# Patient Record
Sex: MALE | Race: BLACK OR AFRICAN AMERICAN | NOT HISPANIC OR LATINO | Employment: UNEMPLOYED | URBAN - METROPOLITAN AREA
[De-identification: names, ages, dates, MRNs, and addresses within clinical notes are randomized per-mention and may not be internally consistent; named-entity substitution may affect disease eponyms.]

---

## 2019-03-10 ENCOUNTER — HOSPITAL ENCOUNTER (EMERGENCY)
Facility: HOSPITAL | Age: 44
Discharge: HOME | End: 2019-03-10
Attending: EMERGENCY MEDICINE
Payer: OTHER GOVERNMENT

## 2019-03-10 VITALS
SYSTOLIC BLOOD PRESSURE: 130 MMHG | RESPIRATION RATE: 16 BRPM | OXYGEN SATURATION: 96 % | DIASTOLIC BLOOD PRESSURE: 93 MMHG | WEIGHT: 226 LBS | HEART RATE: 77 BPM | TEMPERATURE: 98 F

## 2019-03-10 DIAGNOSIS — R21 RASH AND NONSPECIFIC SKIN ERUPTION: Primary | ICD-10-CM

## 2019-03-10 PROCEDURE — 99284 EMERGENCY DEPT VISIT MOD MDM: CPT | Mod: 25,ER

## 2019-03-10 PROCEDURE — 63600175 PHARM REV CODE 636 W HCPCS: Mod: ER | Performed by: NURSE PRACTITIONER

## 2019-03-10 PROCEDURE — 96372 THER/PROPH/DIAG INJ SC/IM: CPT | Mod: ER

## 2019-03-10 PROCEDURE — 25000003 PHARM REV CODE 250: Mod: ER | Performed by: NURSE PRACTITIONER

## 2019-03-10 RX ORDER — HYDROXYZINE HYDROCHLORIDE 25 MG/1
25 TABLET, FILM COATED ORAL EVERY 6 HOURS
Qty: 12 TABLET | Refills: 0 | Status: SHIPPED | OUTPATIENT
Start: 2019-03-10 | End: 2019-03-13

## 2019-03-10 RX ORDER — HYDROCORTISONE 25 MG/ML
LOTION TOPICAL 2 TIMES DAILY
Qty: 60 ML | Refills: 0 | Status: SHIPPED | OUTPATIENT
Start: 2019-03-10 | End: 2022-03-25

## 2019-03-10 RX ORDER — FAMOTIDINE 20 MG/1
20 TABLET, FILM COATED ORAL 2 TIMES DAILY
Qty: 6 TABLET | Refills: 0 | Status: SHIPPED | OUTPATIENT
Start: 2019-03-10 | End: 2022-03-25

## 2019-03-10 RX ORDER — FAMOTIDINE 20 MG/1
20 TABLET, FILM COATED ORAL
Status: COMPLETED | OUTPATIENT
Start: 2019-03-10 | End: 2019-03-10

## 2019-03-10 RX ORDER — DEXAMETHASONE SODIUM PHOSPHATE 4 MG/ML
8 INJECTION, SOLUTION INTRA-ARTICULAR; INTRALESIONAL; INTRAMUSCULAR; INTRAVENOUS; SOFT TISSUE
Status: COMPLETED | OUTPATIENT
Start: 2019-03-10 | End: 2019-03-10

## 2019-03-10 RX ADMIN — DEXAMETHASONE SODIUM PHOSPHATE 8 MG: 4 INJECTION, SOLUTION INTRAMUSCULAR; INTRAVENOUS at 10:03

## 2019-03-10 RX ADMIN — FAMOTIDINE 20 MG: 20 TABLET ORAL at 10:03

## 2019-03-10 NOTE — DISCHARGE INSTRUCTIONS
Thank you for allowing me to care for you today.  I hope our treatment plan will make you feel better in the next few days.  In order for me to take better care of my future patients and improve our Emergency Department, I would appreciate if you can provide us with feedback.  In the next few days, you may receive a survey in the mail.  If you do, it would mean a great deal to me if you would please take the time to complete it.    Thank you and I hope you feel better.  Morelia Vincent NP

## 2019-03-10 NOTE — ED PROVIDER NOTES
"Encounter Date: 3/10/2019       History     Chief Complaint   Patient presents with    Rash     Pt states," I have a rash on both my hands for about one week."     The patient is a 43-year-old male with no pertinent past medical history who presents to the ED complaining of a rash to both hands for the past week.  The patient is afebrile, non-toxic appearing, and hemodynamically stable.  Rash is "itchy sometimes".  No known allergies.  Patient believes that the rash may have begun after cleaning with chemicals.  Patient reports history of similar symptoms to a lesser degree that resolved without intervention.  No respiratory symptoms. No fevers.  He was seen by his PCP and prescribed ketoconazole which he has been using over the past week without relief of symptoms.      The history is provided by the patient.     Review of patient's allergies indicates:  No Known Allergies  History reviewed. No pertinent past medical history.  Past Surgical History:   Procedure Laterality Date    left knee sx       Family History   Problem Relation Age of Onset    Diabetes Mother      Social History     Tobacco Use    Smoking status: Never Smoker    Smokeless tobacco: Never Used   Substance Use Topics    Alcohol use: Not on file    Drug use: Not on file     Review of Systems   Constitutional: Negative for chills and fever.   HENT: Negative for sore throat.    Respiratory: Negative for shortness of breath.    Cardiovascular: Negative for chest pain.   Gastrointestinal: Negative for abdominal pain, nausea and vomiting.   Genitourinary: Negative for dysuria.   Musculoskeletal: Negative for back pain.   Skin: Positive for rash.   Neurological: Negative for weakness.   Hematological: Does not bruise/bleed easily.       Physical Exam     Initial Vitals [03/10/19 0950]   BP Pulse Resp Temp SpO2   (!) 130/93 77 16 98 °F (36.7 °C) 96 %      MAP       --         Physical Exam    Nursing note and vitals reviewed.  Constitutional: He " appears well-developed and well-nourished.  Non-toxic appearance. No distress.   HENT:   Head: Normocephalic and atraumatic.   Right Ear: Hearing and external ear normal.   Left Ear: Hearing and external ear normal.   Nose: Nose normal.   Mouth/Throat: Uvula is midline, oropharynx is clear and moist and mucous membranes are normal.   No oropharyngeal edema   Eyes: Conjunctivae and EOM are normal.   Neck: Full passive range of motion without pain. Neck supple.   Cardiovascular: Normal rate and normal pulses.   Pulses:       Radial pulses are 2+ on the right side, and 2+ on the left side.   Pulmonary/Chest: Effort normal and breath sounds normal. No respiratory distress. He has no wheezes. He has no rhonchi. He has no rales.   Musculoskeletal: Normal range of motion.   Neurological: He is alert and oriented to person, place, and time. He has normal strength. Gait normal.   Skin: Skin is warm, dry and intact. Capillary refill takes less than 2 seconds. Rash noted. Rash is urticarial.   Photos included   Psychiatric: He has a normal mood and affect. His speech is normal and behavior is normal. Judgment and thought content normal. Cognition and memory are normal.                 ED Course   Procedures  Labs Reviewed - No data to display       Imaging Results    None          Medical Decision Making:   History:   Old Medical Records: I decided to obtain old medical records.  ED Management:  This is a 43 y.o. male patient with presentation of rash. After my evaluation and workup, I believe this patient has a nonspecific rash, possibly allergic rash based upon history and physical. I do not believe the patient has a serious bacterial infection such as cellulitis, nor does the rash resemble serious condition such as Lyme disease, or syphilis.  I also do not believe the patient has scabies or bedbugs based on history.      At the present time, the patient does not have any emergent condition requiring admission in the  hospital.  Patient is stable for discharge.  I have ordered a Decadron injection here in the ED.  Additionally, I will prescribe Pepcid, hydroxyzine, and topical hydrocortisone.  Results, clinical impression and rx discussed with patient. Pt to call for follow up with PCP or referred physician.  Patient has a dermatology appointment scheduled for follow-up.  Emphasized importance of keeping this appointment.  Pt is to return to the ED immediately for any new or worsening symptoms.  Pt had time for ask questions to which they were addressed. Pt expressed understanding.                        Clinical Impression:       ICD-10-CM ICD-9-CM   1. Rash and nonspecific skin eruption R21 782.1                                Morelia Vincent NP  03/10/19 1026       Morelia Vincent NP  03/10/19 3544

## 2021-12-24 ENCOUNTER — CLINICAL SUPPORT (OUTPATIENT)
Dept: URGENT CARE | Facility: CLINIC | Age: 46
End: 2021-12-24
Payer: MEDICAID

## 2021-12-24 DIAGNOSIS — Z11.59 SCREENING FOR VIRAL DISEASE: Primary | ICD-10-CM

## 2021-12-24 PROCEDURE — U0005 INFEC AGEN DETEC AMPLI PROBE: HCPCS

## 2021-12-24 PROCEDURE — U0003 INFECTIOUS AGENT DETECTION BY NUCLEIC ACID (DNA OR RNA); SEVERE ACUTE RESPIRATORY SYNDROME CORONAVIRUS 2 (SARS-COV-2) (CORONAVIRUS DISEASE [COVID-19]), AMPLIFIED PROBE TECHNIQUE, MAKING USE OF HIGH THROUGHPUT TECHNOLOGIES AS DESCRIBED BY CMS-2020-01-R: HCPCS

## 2021-12-25 LAB — SARS-COV-2 RNA RESP QL NAA+PROBE: NOT DETECTED

## 2022-02-08 ENCOUNTER — OFFICE VISIT (OUTPATIENT)
Dept: ORTHOPEDICS | Facility: CLINIC | Age: 47
End: 2022-02-08
Payer: MEDICAID

## 2022-02-08 VITALS
DIASTOLIC BLOOD PRESSURE: 70 MMHG | SYSTOLIC BLOOD PRESSURE: 120 MMHG | RESPIRATION RATE: 18 BRPM | OXYGEN SATURATION: 99 % | BODY MASS INDEX: 29.39 KG/M2 | WEIGHT: 217 LBS | HEIGHT: 72 IN | HEART RATE: 80 BPM

## 2022-02-08 DIAGNOSIS — M76.51 PATELLAR TENDINITIS OF RIGHT KNEE: Primary | ICD-10-CM

## 2022-02-08 DIAGNOSIS — M25.561 PAIN IN BOTH KNEES, UNSPECIFIED CHRONICITY: Primary | ICD-10-CM

## 2022-02-08 DIAGNOSIS — M25.562 PAIN IN BOTH KNEES, UNSPECIFIED CHRONICITY: Primary | ICD-10-CM

## 2022-02-08 PROCEDURE — 99203 PR OFFICE/OUTPT VISIT, NEW, LEVL III, 30-44 MIN: ICD-10-PCS | Mod: S$PBB,,, | Performed by: ORTHOPAEDIC SURGERY

## 2022-02-08 PROCEDURE — 99999 PR PBB SHADOW E&M-EST. PATIENT-LVL III: ICD-10-PCS | Mod: PBBFAC,,, | Performed by: ORTHOPAEDIC SURGERY

## 2022-02-08 PROCEDURE — 99213 OFFICE O/P EST LOW 20 MIN: CPT | Mod: PBBFAC,PN | Performed by: ORTHOPAEDIC SURGERY

## 2022-02-08 PROCEDURE — 99999 PR PBB SHADOW E&M-EST. PATIENT-LVL III: CPT | Mod: PBBFAC,,, | Performed by: ORTHOPAEDIC SURGERY

## 2022-02-08 PROCEDURE — 99203 OFFICE O/P NEW LOW 30 MIN: CPT | Mod: S$PBB,,, | Performed by: ORTHOPAEDIC SURGERY

## 2022-02-08 RX ORDER — METHYLPREDNISOLONE 4 MG/1
TABLET ORAL
Qty: 1 EACH | Refills: 0 | Status: SHIPPED | OUTPATIENT
Start: 2022-02-08 | End: 2022-03-25

## 2022-02-08 NOTE — PROGRESS NOTES
NEW PATIENT ORTHOPAEDIC: Knee    PRIMARY CARE PHYSICIAN: Primary Doctor No   REFERRING PROVIDER: Aaareferral Self  No address on file     ASSESSMENT & PLAN:    Impression:  Right Knee Patellar tendonitis     Follow Up Plan: PRN    Non operative care:    Vahe Molina has physical exam evidence of above and wishes to pursue an non-operative care. I am recommending the following: medrol dosepak.  Patient is a 1 week history of right knee pain which is primarily centered over the anterior portion of his knee.  He does not have any injury that he can recall.  No instability symptoms.  No meniscal pathology.  He has tenderness directly over the insertion of his patellar tendon on his tibia tubercle.  This would be consistent with patella tendinitis.  For this I am recommending a Medrol Dosepak.  Should his pain continue despite activity modification and the Dosepak next step would be attempted formal physical therapy.    The patient has been ordered:  Pain Prescription    CONSULTS:   None    ACTIVE PROBLEM LIST  There is no problem list on file for this patient.          SUBJECTIVE    CHIEF COMPLAINT: Knee Pain    HPI:   Vahe Molina is a 46 y.o. male here for evaluation and management of right knee pain. There is not a specific incident that brought about this pain. he has had progressive problems with the knee(s) starting 1 weeks ago but is now progressing to interfere with activities which include: enjoying hobbies and exercise    Currently the pain in the joint is rated at mild with activity. The pain is intermittent and is located in the knee, located anterior. The pain is described as aching and throbbing. Relieving factors include rest and over the counter medication .     There is not associated Catching, Clicking and Popping.     Vahe Molina has no additional complaints.     PROGRESSIVE SYMPTOMS:  Pain limiting ability to stay fit and healthy    FUNCTIONAL STATUS:   Participate in  recreational activities     PREVIOUS TREATMENTS:  Medical: OTC NSAIDS  Physical Therapy: Activities Modified   Previous Orthopaedic Surgery: ACL surgery on left knee    REVIEW OF SYSTEMS:  PAIN ASSESSMENT:  See HPI.  MUSCULOSKELETAL: See HPI.  OTHER 10 point review of systems is negative except as stated in HPI above    PAST MEDICAL HISTORY   has no past medical history on file.     PAST SURGICAL HISTORY   has a past surgical history that includes left knee sx.     FAMILY HISTORY  family history includes Diabetes in his mother.     SOCIAL HISTORY   reports that he has never smoked. He has never used smokeless tobacco. He reports previous drug use. He reports that he does not drink alcohol.     ALLERGIES   Review of patient's allergies indicates:  No Known Allergies     MEDICATIONS  Current Outpatient Medications on File Prior to Visit   Medication Sig Dispense Refill    famotidine (PEPCID) 20 MG tablet Take 1 tablet (20 mg total) by mouth 2 (two) times daily. for 3 days 6 tablet 0    hydrocortisone 2.5 % lotion Apply topically 2 (two) times daily. 60 mL 0     No current facility-administered medications on file prior to visit.          PHYSICAL EXAM   height is 6' (1.829 m) and weight is 98.4 kg (217 lb). His blood pressure is 120/70 and his pulse is 80. His respiration is 18 and oxygen saturation is 99%.   Body mass index is 29.43 kg/m².      All other systems deferred.  GENERAL:  No acute distress  HABITUS: Normal  GAIT: Non-antalgic  SKIN: Normal     KNEE EXAM:    right:   Effusion: No joint effusion  TTP: yes over distal 1/3 of patella tendon  no crepitus with passive knee ROM  Passive ROM: Extension 0, Flexion 130  No pain with manipulation of patella  Stable to varus/valgus stress. No increased laxity to anterior/posterior drawer testing  negative Ignacio's test  No pain with IR/ER rotation of the hip  5/5 strength in knee flexion and extension, ankle plantarflexion and dorsiflexion  Neurovascular Status:  Sensation intact to light touch in Sural, Saphenous, SPN, DPN, Tibial nerve distribution  2+ pulse DP/PT, normal capillary refill, foot has normal warmth    DATA:  Diagnostic tests reviewed for today's visit:     The obtained knee radiographs appears normal for age. There is good alignment with no evidence of fracture, bony abnormalities or signficant degenerative changes.

## 2022-03-24 ENCOUNTER — HOSPITAL ENCOUNTER (INPATIENT)
Facility: HOSPITAL | Age: 47
LOS: 3 days | Discharge: HOME | DRG: 373 | End: 2022-03-27
Attending: EMERGENCY MEDICINE | Admitting: INTERNAL MEDICINE
Payer: MEDICAID

## 2022-03-24 DIAGNOSIS — B99.9 INTRA-ABDOMINAL INFECTION: ICD-10-CM

## 2022-03-24 DIAGNOSIS — R10.13 EPIGASTRIC ABDOMINAL PAIN: ICD-10-CM

## 2022-03-24 DIAGNOSIS — A41.9 SEPSIS, DUE TO UNSPECIFIED ORGANISM, UNSPECIFIED WHETHER ACUTE ORGAN DYSFUNCTION PRESENT: Primary | ICD-10-CM

## 2022-03-24 LAB
ALBUMIN SERPL-MCNC: 3.7 G/DL (ref 3.3–5.5)
ALBUMIN SERPL-MCNC: 4 G/DL (ref 3.3–5.5)
ALLENS TEST: ABNORMAL
ALLENS TEST: ABNORMAL
ALP SERPL-CCNC: 58 U/L (ref 42–141)
ALP SERPL-CCNC: 62 U/L (ref 42–141)
BILIRUB SERPL-MCNC: 2.2 MG/DL (ref 0.2–1.6)
BILIRUB SERPL-MCNC: 2.4 MG/DL (ref 0.2–1.6)
BILIRUBIN, POC UA: ABNORMAL
BLOOD, POC UA: NEGATIVE
BUN SERPL-MCNC: 12 MG/DL (ref 7–22)
CALCIUM SERPL-MCNC: 9.6 MG/DL (ref 8–10.3)
CHLORIDE SERPL-SCNC: 102 MMOL/L (ref 98–108)
CLARITY, POC UA: ABNORMAL
COLOR, POC UA: ABNORMAL
CREAT SERPL-MCNC: 1.2 MG/DL (ref 0.6–1.2)
CTP QC/QA: YES
GLUCOSE SERPL-MCNC: 150 MG/DL (ref 73–118)
GLUCOSE, POC UA: NEGATIVE
HCO3 UR-SCNC: 22.7 MMOL/L (ref 24–28)
HCO3 UR-SCNC: 23.4 MMOL/L (ref 24–28)
KETONES, POC UA: ABNORMAL
LDH SERPL L TO P-CCNC: 1.47 MMOL/L (ref 0.5–2.2)
LDH SERPL L TO P-CCNC: 2.08 MMOL/L (ref 0.5–2.2)
LEUKOCYTE EST, POC UA: NEGATIVE
NITRITE, POC UA: NEGATIVE
PCO2 BLDA: 30.4 MMHG (ref 35–45)
PCO2 BLDA: 35.5 MMHG (ref 35–45)
PH SMN: 7.41 [PH] (ref 7.35–7.45)
PH SMN: 7.49 [PH] (ref 7.35–7.45)
PH UR STRIP: 5.5 [PH]
PO2 BLDA: 60 MMHG (ref 40–60)
PO2 BLDA: 80 MMHG (ref 40–60)
POC ALT (SGPT): 29 U/L (ref 10–47)
POC ALT (SGPT): 67 U/L (ref 10–47)
POC AMYLASE: 52 U/L (ref 14–97)
POC AST (SGOT): 43 U/L (ref 11–38)
POC AST (SGOT): 44 U/L (ref 11–38)
POC BE: -1 MMOL/L
POC BE: 1 MMOL/L
POC GGT: 29 U/L (ref 5–65)
POC SATURATED O2: 91 % (ref 95–100)
POC SATURATED O2: 97 % (ref 95–100)
POC TCO2: 24 MMOL/L (ref 18–33)
POC TCO2: 24 MMOL/L (ref 24–29)
POC TCO2: 24 MMOL/L (ref 24–29)
POCT GLUCOSE: 152 MG/DL (ref 70–110)
POCT GLUCOSE: 152 MG/DL (ref 70–110)
POTASSIUM BLD-SCNC: 4.2 MMOL/L (ref 3.6–5.1)
PROTEIN, POC UA: ABNORMAL
PROTEIN, POC: 8.4 G/DL (ref 6.4–8.1)
PROTEIN, POC: 8.5 G/DL (ref 6.4–8.1)
SAMPLE: ABNORMAL
SAMPLE: ABNORMAL
SARS-COV-2 RDRP RESP QL NAA+PROBE: NEGATIVE
SITE: ABNORMAL
SITE: ABNORMAL
SODIUM BLD-SCNC: 136 MMOL/L (ref 128–145)
SPECIFIC GRAVITY, POC UA: 1.02
UROBILINOGEN, POC UA: 0.2 E.U./DL

## 2022-03-24 PROCEDURE — 96361 HYDRATE IV INFUSION ADD-ON: CPT | Mod: ER

## 2022-03-24 PROCEDURE — 25000003 PHARM REV CODE 250: Mod: ER | Performed by: NURSE PRACTITIONER

## 2022-03-24 PROCEDURE — 21400001 HC TELEMETRY ROOM

## 2022-03-24 PROCEDURE — 63600175 PHARM REV CODE 636 W HCPCS: Mod: ER | Performed by: NURSE PRACTITIONER

## 2022-03-24 PROCEDURE — U0002 COVID-19 LAB TEST NON-CDC: HCPCS | Mod: ER | Performed by: NURSE PRACTITIONER

## 2022-03-24 PROCEDURE — 85025 COMPLETE CBC W/AUTO DIFF WBC: CPT | Mod: ER

## 2022-03-24 PROCEDURE — 82962 GLUCOSE BLOOD TEST: CPT | Mod: ER

## 2022-03-24 PROCEDURE — 93010 ELECTROCARDIOGRAM REPORT: CPT | Mod: ,,, | Performed by: INTERNAL MEDICINE

## 2022-03-24 PROCEDURE — 93010 EKG 12-LEAD: ICD-10-PCS | Mod: ,,, | Performed by: INTERNAL MEDICINE

## 2022-03-24 PROCEDURE — 96365 THER/PROPH/DIAG IV INF INIT: CPT | Mod: ER

## 2022-03-24 PROCEDURE — 93005 ELECTROCARDIOGRAM TRACING: CPT | Mod: ER

## 2022-03-24 PROCEDURE — 96375 TX/PRO/DX INJ NEW DRUG ADDON: CPT | Mod: ER

## 2022-03-24 PROCEDURE — 87040 BLOOD CULTURE FOR BACTERIA: CPT | Mod: 59 | Performed by: NURSE PRACTITIONER

## 2022-03-24 PROCEDURE — 82150 ASSAY OF AMYLASE: CPT | Mod: ER

## 2022-03-24 PROCEDURE — 99285 EMERGENCY DEPT VISIT HI MDM: CPT | Mod: 25,ER

## 2022-03-24 PROCEDURE — 25500020 PHARM REV CODE 255: Mod: ER | Performed by: EMERGENCY MEDICINE

## 2022-03-24 PROCEDURE — 82803 BLOOD GASES ANY COMBINATION: CPT | Mod: ER

## 2022-03-24 PROCEDURE — 80053 COMPREHEN METABOLIC PANEL: CPT | Mod: ER

## 2022-03-24 PROCEDURE — 81003 URINALYSIS AUTO W/O SCOPE: CPT | Mod: ER

## 2022-03-24 RX ORDER — ACETAMINOPHEN 325 MG/1
650 TABLET ORAL EVERY 8 HOURS PRN
Status: DISCONTINUED | OUTPATIENT
Start: 2022-03-24 | End: 2022-03-27 | Stop reason: HOSPADM

## 2022-03-24 RX ORDER — GLUCAGON 1 MG
1 KIT INJECTION
Status: DISCONTINUED | OUTPATIENT
Start: 2022-03-24 | End: 2022-03-27 | Stop reason: HOSPADM

## 2022-03-24 RX ORDER — MAG HYDROX/ALUMINUM HYD/SIMETH 200-200-20
30 SUSPENSION, ORAL (FINAL DOSE FORM) ORAL ONCE
Status: COMPLETED | OUTPATIENT
Start: 2022-03-24 | End: 2022-03-24

## 2022-03-24 RX ORDER — MORPHINE SULFATE 4 MG/ML
4 INJECTION, SOLUTION INTRAMUSCULAR; INTRAVENOUS EVERY 4 HOURS PRN
Status: DISCONTINUED | OUTPATIENT
Start: 2022-03-24 | End: 2022-03-27 | Stop reason: HOSPADM

## 2022-03-24 RX ORDER — SODIUM CHLORIDE 0.9 % (FLUSH) 0.9 %
10 SYRINGE (ML) INJECTION
Status: DISCONTINUED | OUTPATIENT
Start: 2022-03-24 | End: 2022-03-27 | Stop reason: HOSPADM

## 2022-03-24 RX ORDER — INSULIN ASPART 100 [IU]/ML
0-5 INJECTION, SOLUTION INTRAVENOUS; SUBCUTANEOUS EVERY 6 HOURS PRN
Status: DISCONTINUED | OUTPATIENT
Start: 2022-03-24 | End: 2022-03-27 | Stop reason: HOSPADM

## 2022-03-24 RX ORDER — FAMOTIDINE 10 MG/ML
20 INJECTION INTRAVENOUS
Status: COMPLETED | OUTPATIENT
Start: 2022-03-24 | End: 2022-03-24

## 2022-03-24 RX ORDER — MORPHINE SULFATE 4 MG/ML
2 INJECTION, SOLUTION INTRAMUSCULAR; INTRAVENOUS EVERY 4 HOURS PRN
Status: DISCONTINUED | OUTPATIENT
Start: 2022-03-24 | End: 2022-03-27 | Stop reason: HOSPADM

## 2022-03-24 RX ORDER — LIDOCAINE HYDROCHLORIDE 20 MG/ML
10 SOLUTION OROPHARYNGEAL ONCE
Status: COMPLETED | OUTPATIENT
Start: 2022-03-24 | End: 2022-03-24

## 2022-03-24 RX ORDER — ONDANSETRON 2 MG/ML
4 INJECTION INTRAMUSCULAR; INTRAVENOUS EVERY 8 HOURS PRN
Status: DISCONTINUED | OUTPATIENT
Start: 2022-03-24 | End: 2022-03-27 | Stop reason: HOSPADM

## 2022-03-24 RX ORDER — KETOROLAC TROMETHAMINE 30 MG/ML
15 INJECTION, SOLUTION INTRAMUSCULAR; INTRAVENOUS
Status: COMPLETED | OUTPATIENT
Start: 2022-03-24 | End: 2022-03-24

## 2022-03-24 RX ORDER — ACETAMINOPHEN 500 MG
1000 TABLET ORAL
Status: COMPLETED | OUTPATIENT
Start: 2022-03-24 | End: 2022-03-24

## 2022-03-24 RX ORDER — SODIUM CHLORIDE, SODIUM LACTATE, POTASSIUM CHLORIDE, CALCIUM CHLORIDE 600; 310; 30; 20 MG/100ML; MG/100ML; MG/100ML; MG/100ML
INJECTION, SOLUTION INTRAVENOUS CONTINUOUS
Status: DISCONTINUED | OUTPATIENT
Start: 2022-03-24 | End: 2022-03-27 | Stop reason: HOSPADM

## 2022-03-24 RX ADMIN — PIPERACILLIN AND TAZOBACTAM 4.5 G: 4; .5 INJECTION, POWDER, LYOPHILIZED, FOR SOLUTION INTRAVENOUS; PARENTERAL at 05:03

## 2022-03-24 RX ADMIN — LIDOCAINE HYDROCHLORIDE 10 ML: 20 SOLUTION ORAL at 05:03

## 2022-03-24 RX ADMIN — IOHEXOL 100 ML: 350 INJECTION, SOLUTION INTRAVENOUS at 08:03

## 2022-03-24 RX ADMIN — KETOROLAC TROMETHAMINE 15 MG: 30 INJECTION, SOLUTION INTRAMUSCULAR; INTRAVENOUS at 05:03

## 2022-03-24 RX ADMIN — SODIUM CHLORIDE, SODIUM LACTATE, POTASSIUM CHLORIDE, AND CALCIUM CHLORIDE: .6; .31; .03; .02 INJECTION, SOLUTION INTRAVENOUS at 10:03

## 2022-03-24 RX ADMIN — SODIUM CHLORIDE 1000 ML: 0.9 INJECTION, SOLUTION INTRAVENOUS at 05:03

## 2022-03-24 RX ADMIN — SODIUM CHLORIDE 1000 ML: 0.9 INJECTION, SOLUTION INTRAVENOUS at 08:03

## 2022-03-24 RX ADMIN — ALUMINA, MAGNESIA, AND SIMETHICONE ORAL SUSPENSION REGULAR STRENGTH 30 ML: 1200; 1200; 120 SUSPENSION ORAL at 05:03

## 2022-03-24 RX ADMIN — FAMOTIDINE 20 MG: 10 INJECTION INTRAVENOUS at 05:03

## 2022-03-24 RX ADMIN — ACETAMINOPHEN 1000 MG: 500 TABLET ORAL at 04:03

## 2022-03-24 NOTE — ED NOTES
Pt to ED for ruq abd pain X 3 days that began after eating pizza.  He has not eaten for about two days, is tolerating PO.  Denies nausea, vomiting, diarrhea .    LOC: Patient name and date of birth verified. The patient is awake, alert and aware of environment with an appropriate affect, the patient is oriented x 3 and speaking appropriately.   APPEARANCE: Patient resting comfortably, patient is clean and well groomed, patient's clothing is properly fastened.  SKIN: The skin is warm and dry, color consistent with ethnicity, patient has normal skin turgor and moist mucus membranes, skin intact, no breakdown or bruising noted.  MUSCULOSKELETAL: Patient moving all extremities well, no obvious swelling or deformities noted.   RESPIRATORY: Respirations are spontaneous, patient has a normal effort and rate, no accessory muscle use noted.  CARDIAC: Patient has a normal rate and rhythm, no periphreal edema noted, capillary refill < 3 seconds.  ABDOMEN: Soft and tender on palpation to ruq, no distention noted. Bowel sounds present in all four quadrants.  NEUROLOGIC: Eyes open spontaneously, behavior appropriate to situation, follows commands, facial expression symmetrical, bilateral hand grasp equal and even, purposeful motor response noted, normal sensation in all extremities when touched with a finger.

## 2022-03-24 NOTE — ED PROVIDER NOTES
Encounter Date: 3/24/2022    SCRIBE #1 NOTE: I, Balbina Moreira, am scribing for, and in the presence of,  Courtney Burnham NP. I have scribed the following portions of the note - Other sections scribed: HPI; ROS.       History     Chief Complaint   Patient presents with    Abdominal Pain     Pt reports abd pain x2 days, seen at urgent care, no relief with Bentyl      Vahemerari Molina is a 46 y.o. male with Hx of DM who presents to the ED for chief complaint of mid to RUQ abdominal pain onset 4 days ago. Patient reports he went to Urgent Care 3 days ago where he was prescribed medications and was told to go to the ER if symptoms did not improve. He states that the pain is not as bad as the first day, but is still present. Patient notes bowel movements are normal. He denies any previous abdominal surgery, recent travel outside of the country, eating seafood, or any known sick contact. Patient states that when the pain first started he had just eaten pizza. He reports he has not eaten in 2 days due to the pain and loss of appetite. Patient present with 100.4 F fever upon arrival to the ED. He denies chills, nausea, vomiting, diarrhea, hematochezia, dysuria, difficulty urinating, back pain, chest pain, shortness of breath or cough. Patient also denies use of tobacco, EtOH, or recreational drugs.     The history is provided by the patient. No  was used.     Review of patient's allergies indicates:  No Known Allergies  Past Medical History:   Diagnosis Date    Diabetes mellitus      Past Surgical History:   Procedure Laterality Date    left knee sx       Family History   Problem Relation Age of Onset    Diabetes Mother      Social History     Tobacco Use    Smoking status: Never Smoker    Smokeless tobacco: Never Used   Substance Use Topics    Alcohol use: Never    Drug use: Not Currently     Review of Systems   Constitutional: Positive for appetite change and fever.   HENT: Negative for  congestion, ear pain, rhinorrhea and sore throat.    Respiratory: Negative for cough and shortness of breath.    Cardiovascular: Negative for chest pain.   Gastrointestinal: Positive for abdominal pain. Negative for blood in stool, constipation, diarrhea, nausea and vomiting.   Genitourinary: Negative for difficulty urinating and dysuria.   Musculoskeletal: Negative for back pain.   Skin: Negative for rash.   Neurological: Negative for headaches.   Psychiatric/Behavioral: Negative for confusion.   All other systems reviewed and are negative.      Physical Exam     Initial Vitals [03/24/22 1604]   BP Pulse Resp Temp SpO2   (!) 131/91 104 17 (!) 100.4 °F (38 °C) 99 %      MAP       --         Physical Exam    Vitals reviewed.  Constitutional: He appears well-developed and well-nourished. He is not diaphoretic.  Non-toxic appearance. He does not have a sickly appearance. He does not appear ill. No distress.   HENT:   Head: Normocephalic and atraumatic.   Right Ear: External ear normal.   Left Ear: External ear normal.   Neck:   Normal range of motion.  Cardiovascular: Normal rate, regular rhythm, normal heart sounds and intact distal pulses.   Pulmonary/Chest: Breath sounds normal. No respiratory distress.   Abdominal: Abdomen is soft. Bowel sounds are normal. There is abdominal tenderness in the epigastric area and periumbilical area.   Musculoskeletal:         General: Normal range of motion.      Cervical back: Normal range of motion.     Neurological: He is alert and oriented to person, place, and time. GCS eye subscore is 4. GCS verbal subscore is 5. GCS motor subscore is 6.   Skin: Skin is warm, dry and intact. No rash noted. No erythema.   Psychiatric: He has a normal mood and affect. Thought content normal.         ED Course   Critical Care    Date/Time: 4/7/2022 10:13 AM  Performed by: Courtney Burnham NP  Authorized by: Beltran Edwards MD   Direct patient critical care time: 10 minutes  Additional  history critical care time: 5 minutes  Ordering / reviewing critical care time: 5 minutes  Documentation critical care time: 5 minutes  Consulting other physicians critical care time: 5 minutes  Consult with family critical care time: 0 minutes  Total critical care time (exclusive of procedural time) : 30 minutes  Critical care was necessary to treat or prevent imminent or life-threatening deterioration of the following conditions: shock and sepsis.  Critical care was time spent personally by me on the following activities: discussions with consultants, evaluation of patient's response to treatment, obtaining history from patient or surrogate, ordering and review of laboratory studies, development of treatment plan with patient or surrogate, examination of patient, ordering and performing treatments and interventions, ordering and review of radiographic studies and re-evaluation of patient's condition.        Labs Reviewed   POCT URINALYSIS W/O SCOPE - Abnormal; Notable for the following components:       Result Value    Bilirubin, UA 1+ (*)     Ketones, UA 1+ (*)     Protein, UA 2+ (*)     All other components within normal limits   POCT GLUCOSE - Abnormal; Notable for the following components:    POCT Glucose 152 (*)     All other components within normal limits   POCT LIVER PANEL - Abnormal; Notable for the following components:    ALT (SGPT), POC 67 (*)     AST (SGOT), POC 44 (*)     Bilirubin, POC 2.4 (*)     Protein, POC 8.5 (*)     All other components within normal limits   POCT CMP - Abnormal; Notable for the following components:    AST (SGOT), POC 43 (*)     POC Glucose 150 (*)     Bilirubin, POC 2.2 (*)     Protein, POC 8.4 (*)     All other components within normal limits   ISTAT PROCEDURE - Abnormal; Notable for the following components:    POC PH 7.494 (*)     POC PCO2 30.4 (*)     POC PO2 80 (*)     POC HCO3 23.4 (*)     All other components within normal limits   ISTAT PROCEDURE - Abnormal; Notable  for the following components:    POC HCO3 22.7 (*)     POC SATURATED O2 91 (*)     All other components within normal limits   POCT GLUCOSE - Abnormal; Notable for the following components:    POCT Glucose 152 (*)     All other components within normal limits   CULTURE, BLOOD   CULTURE, BLOOD   POCT CBC   POCT URINALYSIS W/O SCOPE   SARS-COV-2 RDRP GENE    Narrative:     This test utilizes isothermal nucleic acid amplification   technology to detect the SARS-CoV-2 RdRp nucleic acid segment.   The analytical sensitivity (limit of detection) is 125 genome   equivalents/mL.   A POSITIVE result implies infection with the SARS-CoV-2 virus;   the patient is presumed to be contagious.     A NEGATIVE result means that SARS-CoV-2 nucleic acids are not   present above the limit of detection. A NEGATIVE result should be   treated as presumptive. It does not rule out the possibility of   COVID-19 and should not be the sole basis for treatment decisions.   If COVID-19 is strongly suspected based on clinical and exposure   history, re-testing using an alternate molecular assay should be   considered.   This test is only for use under the Food and Drug   Administration s Emergency Use Authorization (EUA).   Commercial kits are provided by [employer].   Performance characteristics of the EUA have been independently   verified by [facility] Department of   Pathology and Laboratory Medicine.   _________________________________________________________________   The authorized Fact Sheet for Healthcare Providers and the authorized Fact   Sheet for Patients of the ID NOW COVID-19 are available on the FDA   website:     https://www.fda.gov/media/412523/download  https://www.fda.gov/media/953917/download          POCT CMP   POCT LIVER PANEL   POCT GLUCOSE MONITORING CONTINUOUS     EKG Readings: (Independently Interpreted)   Initial Reading: No STEMI. Rhythm: Sinus Tachycardia. Heart Rate: 103. Ectopy: No Ectopy.  Conduction: Normal.       Imaging Results           CT Abdomen Pelvis With Contrast (Final result)  Result time 03/24/22 20:39:18    Final result by Bautista Rao MD (03/24/22 20:39:18)                 Impression:      1. Grossly abnormal appearance with extensive inflammatory changes seen involving mid and distal small bowel loops in the right lower quadrant and mid to lower abdomen.  Questionable markedly dilated appendix seen measuring 3 cm versus abnormal appearance of distal small bowel loops.  No free air seen, however there is appearance of developing multilocular abscess in the mid to lower abdomen which could potentially relate to contained perforation.  Acute appendicitis versus reactive or other infectious/inflammatory enteritis to be considered.  Potential neoplastic process not excluded.  Surgical consultation is advised.  2. Additional findings as detailed above.  This report was flagged in Epic as abnormal.      Electronically signed by: Bautista Rao MD  Date:    03/24/2022  Time:    20:39             Narrative:    EXAMINATION:  CT ABDOMEN PELVIS WITH CONTRAST    CLINICAL HISTORY:  Abdominal abscess/infection suspected;    TECHNIQUE:  Low dose axial images, sagittal and coronal reformations were obtained from the lung bases to the pubic symphysis following the IV administration of 100 mL of Omnipaque 350.    COMPARISON:  None.    FINDINGS:  The visualized portion of the heart is unremarkable.  Minimal atelectatic changes are seen at the lung bases.  No focal consolidation or pleural effusion seen.    Liver is enlarged measuring 19.5 cm.  No significant focal hepatic abnormalities are seen.  There is no intra-or extrahepatic biliary ductal dilatation.  The gallbladder is unremarkable.  High density material or contrast are seen within the stomach and small bowel loops.  The stomach, pancreas, spleen, and adrenal glands are unremarkable.    Kidneys enhance normally with no evidence of  hydronephrosis.  Ureters are difficult to track distally.  Urinary bladder is nondistended.  Prostate is unremarkable.    Grossly abnormal appearance seen of mid and distal small bowel loops in the mid to lower abdomen and right lower quadrant.  Questionable markedly dilated appendix seen measuring 3 cm.  Extensive inflammatory changes are seen in the region with questionable developing multilocular abscess.  This is difficult to accurately measure.  No convincing CT evidence of bowel obstruction at this time.  There is diverticulosis seen involving the distal descending and proximal sigmoid colon.  Trace peritoneal free fluid is seen.  No free air or perforation seen.    Aorta tapers normally.    No acute osseous abnormality identified. Small umbilical hernia is seen.                                 Medications   iohexol (OMNIPAQUE) oral solution 15 mL (has no administration in time range)   sodium chloride 0.9% flush 10 mL (has no administration in time range)   acetaminophen tablet 650 mg (has no administration in time range)   lactated ringers infusion (has no administration in time range)   piperacillin-tazobactam 4.5 g in dextrose 5 % 100 mL IVPB (ready to mix system) (has no administration in time range)   morphine injection 2 mg (has no administration in time range)   morphine injection 4 mg (has no administration in time range)   ondansetron injection 4 mg (has no administration in time range)   dextrose 50% injection 12.5 g (has no administration in time range)   glucagon (human recombinant) injection 1 mg (has no administration in time range)   insulin aspart U-100 pen 0-5 Units (has no administration in time range)   acetaminophen tablet 1,000 mg (1,000 mg Oral Given 3/24/22 1642)   sodium chloride 0.9% bolus 1,000 mL (0 mLs Intravenous Stopped 3/24/22 1806)   ketorolac injection 15 mg (15 mg Intravenous Given 3/24/22 1706)   famotidine (PF) injection 20 mg (20 mg Intravenous Given 3/24/22 1707)    aluminum-magnesium hydroxide-simethicone 200-200-20 mg/5 mL suspension 30 mL (30 mLs Oral Given 3/24/22 1707)     And   LIDOcaine HCl 2% oral solution 10 mL (10 mLs Oral Given 3/24/22 1707)   piperacillin-tazobactam 4.5 g in dextrose 5 % 100 mL IVPB (ready to mix system) (0 g Intravenous Stopped 3/24/22 1817)   iohexoL (OMNIPAQUE 350) injection 100 mL (100 mLs Intravenous Given 3/24/22 2007)   sodium chloride 0.9% bolus 1,000 mL (1,000 mLs Intravenous New Bag 3/24/22 2018)     Medical Decision Making:   ED Management:  46-year-old male presenting to the ED with epigastric abdominal pain with low grade fevers.  Patient is nontoxic and in no distress.  Patient has tenderness to palpation of the midepigastric region.  CBC with leukocytosis white blood cell count 15771. Blood cultures pending.  Lactate initially 2.08.  Given 30 ml/kg NS.  IV Zosyn.  CT with extensive inflammatory changes in the mid and distal small bowel loops in the right lower quadrant and mid to lower abdomen.  Acute appendicitis with contained perforation versus reactive or infectious enteritis versus neoplastic process.  Discussed the case with general surgery Dr. Rose who would like the patient transferred to [facility] for admission.  Recommend NPO, IV Zosyn, LR infusion, pain meds.  Repeat lactic is 1.47.  Tachycardia and fever improved.  Pain is well controlled.  Patient is agreeable to transfer.    This case was discussed with my attending physician Dr. Edwards who examined the patient and agrees with my plan of care.          Scribe Attestation:   Scribe #1: I performed the above scribed service and the documentation accurately describes the services I performed. I attest to the accuracy of the note.    Attending Attestation:   Physician Attestation Statement for Resident:  As the supervising MD   Physician Attestation Statement: I have personally seen and examined this patient.   I agree with the above history. -: midepigastric pain x 4  days coupled with low grade fevers. Pt has not been on steroids recently.   As the supervising MD I agree with the above PE.   -: +ttp midepigastric, no ttp mcburney's, rovsing neg el neg   As the supervising MD I agree with the above treatment, course, plan, and disposition.   -: Ct, labs, abx.                ED Course as of 03/24/22 2147   Thu Mar 24, 2022   2055 Discussed case with general surgeon on-call.  He will look at CT scan and call me back. [MT]      ED Course User Index  [MT] Courtney Burnham NP           Scribe attestation: IANGEL, personally performed the services described in this documentation.  All medical record entries made by the scribe were at my direction and in my presence.  I have reviewed the chart and agree that the record reflects my personal performance and is accurate and complete.  Clinical Impression:   Final diagnoses:  [R10.13] Epigastric abdominal pain  [A41.9] Sepsis, due to unspecified organism, unspecified whether acute organ dysfunction present (Primary)  [B99.9] Intra-abdominal infection          ED Disposition Condition    Admit               Courtney Burnham NP  03/24/22 2141       Courtney Burnham NP  04/07/22 1015

## 2022-03-25 LAB
ALBUMIN SERPL BCP-MCNC: 3 G/DL (ref 3.5–5.2)
ALP SERPL-CCNC: 53 U/L (ref 55–135)
ALT SERPL W/O P-5'-P-CCNC: 25 U/L (ref 10–44)
ANION GAP SERPL CALC-SCNC: 7 MMOL/L (ref 8–16)
AST SERPL-CCNC: 21 U/L (ref 10–40)
BASOPHILS # BLD AUTO: 0.03 K/UL (ref 0–0.2)
BASOPHILS NFR BLD: 0.2 % (ref 0–1.9)
BILIRUB SERPL-MCNC: 1.9 MG/DL (ref 0.1–1)
BUN SERPL-MCNC: 13 MG/DL (ref 6–20)
CALCIUM SERPL-MCNC: 8.6 MG/DL (ref 8.7–10.5)
CHLORIDE SERPL-SCNC: 104 MMOL/L (ref 95–110)
CO2 SERPL-SCNC: 24 MMOL/L (ref 23–29)
CREAT SERPL-MCNC: 1.3 MG/DL (ref 0.5–1.4)
DIFFERENTIAL METHOD: ABNORMAL
EOSINOPHIL # BLD AUTO: 0.1 K/UL (ref 0–0.5)
EOSINOPHIL NFR BLD: 0.8 % (ref 0–8)
ERYTHROCYTE [DISTWIDTH] IN BLOOD BY AUTOMATED COUNT: 12 % (ref 11.5–14.5)
EST. GFR  (AFRICAN AMERICAN): >60 ML/MIN/1.73 M^2
EST. GFR  (NON AFRICAN AMERICAN): >60 ML/MIN/1.73 M^2
GLUCOSE SERPL-MCNC: 144 MG/DL (ref 70–110)
HCT VFR BLD AUTO: 35.4 % (ref 40–54)
HGB BLD-MCNC: 11.3 G/DL (ref 14–18)
IMM GRANULOCYTES # BLD AUTO: 0.07 K/UL (ref 0–0.04)
IMM GRANULOCYTES NFR BLD AUTO: 0.5 % (ref 0–0.5)
LYMPHOCYTES # BLD AUTO: 2.1 K/UL (ref 1–4.8)
LYMPHOCYTES NFR BLD: 16.2 % (ref 18–48)
MCH RBC QN AUTO: 28.8 PG (ref 27–31)
MCHC RBC AUTO-ENTMCNC: 31.9 G/DL (ref 32–36)
MCV RBC AUTO: 90 FL (ref 82–98)
MONOCYTES # BLD AUTO: 1.4 K/UL (ref 0.3–1)
MONOCYTES NFR BLD: 10.2 % (ref 4–15)
NEUTROPHILS # BLD AUTO: 9.6 K/UL (ref 1.8–7.7)
NEUTROPHILS NFR BLD: 72.1 % (ref 38–73)
NRBC BLD-RTO: 0 /100 WBC
PLATELET # BLD AUTO: 274 K/UL (ref 150–450)
PMV BLD AUTO: 10.2 FL (ref 9.2–12.9)
POCT GLUCOSE: 132 MG/DL (ref 70–110)
POTASSIUM SERPL-SCNC: 3.9 MMOL/L (ref 3.5–5.1)
PROT SERPL-MCNC: 6.9 G/DL (ref 6–8.4)
RBC # BLD AUTO: 3.93 M/UL (ref 4.6–6.2)
SODIUM SERPL-SCNC: 135 MMOL/L (ref 136–145)
WBC # BLD AUTO: 13.24 K/UL (ref 3.9–12.7)

## 2022-03-25 PROCEDURE — 99222 PR INITIAL HOSPITAL CARE,LEVL II: ICD-10-PCS | Mod: ,,, | Performed by: SURGERY

## 2022-03-25 PROCEDURE — 99222 1ST HOSP IP/OBS MODERATE 55: CPT | Mod: ,,, | Performed by: SURGERY

## 2022-03-25 PROCEDURE — 63600175 PHARM REV CODE 636 W HCPCS: Mod: ER | Performed by: NURSE PRACTITIONER

## 2022-03-25 PROCEDURE — 80053 COMPREHEN METABOLIC PANEL: CPT | Performed by: NURSE PRACTITIONER

## 2022-03-25 PROCEDURE — 85025 COMPLETE CBC W/AUTO DIFF WBC: CPT | Performed by: NURSE PRACTITIONER

## 2022-03-25 PROCEDURE — 36415 COLL VENOUS BLD VENIPUNCTURE: CPT | Performed by: NURSE PRACTITIONER

## 2022-03-25 PROCEDURE — 63600175 PHARM REV CODE 636 W HCPCS: Performed by: NURSE PRACTITIONER

## 2022-03-25 PROCEDURE — 21400001 HC TELEMETRY ROOM

## 2022-03-25 PROCEDURE — 25000003 PHARM REV CODE 250: Performed by: NURSE PRACTITIONER

## 2022-03-25 RX ORDER — LOSARTAN POTASSIUM 50 MG/1
50 TABLET ORAL DAILY
COMMUNITY
Start: 2022-03-04

## 2022-03-25 RX ORDER — GLIMEPIRIDE 1 MG/1
1 TABLET ORAL EVERY MORNING
COMMUNITY
Start: 2022-03-04

## 2022-03-25 RX ORDER — DICYCLOMINE HYDROCHLORIDE 20 MG/1
20 TABLET ORAL EVERY 6 HOURS PRN
COMMUNITY
Start: 2022-03-22

## 2022-03-25 RX ORDER — AMOXICILLIN 500 MG
CAPSULE ORAL DAILY
COMMUNITY
End: 2022-04-20 | Stop reason: CLARIF

## 2022-03-25 RX ORDER — DEXTROSE 4 G
TABLET,CHEWABLE ORAL
COMMUNITY
Start: 2021-10-21 | End: 2022-04-20 | Stop reason: CLARIF

## 2022-03-25 RX ADMIN — ACETAMINOPHEN 650 MG: 325 TABLET ORAL at 06:03

## 2022-03-25 RX ADMIN — SODIUM CHLORIDE, SODIUM LACTATE, POTASSIUM CHLORIDE, AND CALCIUM CHLORIDE: .6; .31; .03; .02 INJECTION, SOLUTION INTRAVENOUS at 06:03

## 2022-03-25 RX ADMIN — PIPERACILLIN AND TAZOBACTAM 4.5 G: 4; .5 INJECTION, POWDER, LYOPHILIZED, FOR SOLUTION INTRAVENOUS; PARENTERAL at 02:03

## 2022-03-25 RX ADMIN — PIPERACILLIN AND TAZOBACTAM 4.5 G: 4; .5 INJECTION, POWDER, LYOPHILIZED, FOR SOLUTION INTRAVENOUS; PARENTERAL at 10:03

## 2022-03-25 RX ADMIN — PIPERACILLIN AND TAZOBACTAM 4.5 G: 4; .5 INJECTION, POWDER, LYOPHILIZED, FOR SOLUTION INTRAVENOUS; PARENTERAL at 06:03

## 2022-03-25 NOTE — PLAN OF CARE
[facility]         General Surgery History and Physical    03/24/2022  9:04 PM    Vahe Molina  [id number]    SUBJECTIVE:                                                                                           Reason for Admission: Abdominal pain    HPI : Story provided by [facility]. Full physical to follow tomorrow by day time physician  45yo male who presents to [facility] c/o epigastric/periumbilical abdominal pain x4 days resulting in poor PO intake. Pain originally located in the RLQ but then migrated upwards  CT at OSH showed possible ruptured appendicitis with possible  associated multiloculated fluid collection adjacent to the appendix  H Fever to 100.4 and WBC of 21 with left shift    ROS - 12pt ROS negative except those mentioned in the HPI    Objective:                                                                                                  Past Medical History:   Diagnosis Date    Diabetes mellitus        Past Surgical History:   Procedure Laterality Date    left knee sx         Family History   Problem Relation Age of Onset    Diabetes Mother        Social History     Socioeconomic History    Marital status:    Tobacco Use    Smoking status: Never Smoker    Smokeless tobacco: Never Used   Substance and Sexual Activity    Alcohol use: Never    Drug use: Not Currently    Sexual activity: Not Currently       Vitals:    03/24/22 2033   BP: 124/79   Pulse: 91   Resp: 18   Temp: 98.6 °F (37 °C)       No results for input(s): WBC, HGB, HCT, PLT in the last 72 hours.     No results for input(s): NA, K, CL, CO2, BUN, CREATININE, GLU in the last 72 hours.       PHYSICAL EXAM:    Physical Exam:    GEN: NAD, laying comfortably in bed  HEENT: NCAT  RESP: Normal work of breathing, on room air  CV: RRR  ABD: Soft, non distended, ttp in epigastric and periumbilical area  EXT: Moves all, no edema  MSK: Normal inspection, no pain with movement  SKIN: Warm, dry,  intact  PSYCH: Appropriate mood and affect  NEURO: AAOx3    Imaging Results           CT Abdomen Pelvis With Contrast (Final result)  Result time 03/24/22 20:39:18    Final result by Bautista Rao MD (03/24/22 20:39:18)                 Impression:      1. Grossly abnormal appearance with extensive inflammatory changes seen involving mid and distal small bowel loops in the right lower quadrant and mid to lower abdomen.  Questionable markedly dilated appendix seen measuring 3 cm versus abnormal appearance of distal small bowel loops.  No free air seen, however there is appearance of developing multilocular abscess in the mid to lower abdomen which could potentially relate to contained perforation.  Acute appendicitis versus reactive or other infectious/inflammatory enteritis to be considered.  Potential neoplastic process not excluded.  Surgical consultation is advised.  2. Additional findings as detailed above.  This report was flagged in Epic as abnormal.      Electronically signed by: Bautista Rao MD  Date:    03/24/2022  Time:    20:39             Narrative:    EXAMINATION:  CT ABDOMEN PELVIS WITH CONTRAST    CLINICAL HISTORY:  Abdominal abscess/infection suspected;    TECHNIQUE:  Low dose axial images, sagittal and coronal reformations were obtained from the lung bases to the pubic symphysis following the IV administration of 100 mL of Omnipaque 350.    COMPARISON:  None.    FINDINGS:  The visualized portion of the heart is unremarkable.  Minimal atelectatic changes are seen at the lung bases.  No focal consolidation or pleural effusion seen.    Liver is enlarged measuring 19.5 cm.  No significant focal hepatic abnormalities are seen.  There is no intra-or extrahepatic biliary ductal dilatation.  The gallbladder is unremarkable.  High density material or contrast are seen within the stomach and small bowel loops.  The stomach, pancreas, spleen, and adrenal glands are unremarkable.    Kidneys enhance  normally with no evidence of hydronephrosis.  Ureters are difficult to track distally.  Urinary bladder is nondistended.  Prostate is unremarkable.    Grossly abnormal appearance seen of mid and distal small bowel loops in the mid to lower abdomen and right lower quadrant.  Questionable markedly dilated appendix seen measuring 3 cm.  Extensive inflammatory changes are seen in the region with questionable developing multilocular abscess.  This is difficult to accurately measure.  No convincing CT evidence of bowel obstruction at this time.  There is diverticulosis seen involving the distal descending and proximal sigmoid colon.  Trace peritoneal free fluid is seen.  No free air or perforation seen.    Aorta tapers normally.    No acute osseous abnormality identified. Small umbilical hernia is seen.                                  ASSESSMENT / PLAN:                                                                             Case of 46 y.o. male w/ possible ruptured appendicitis and associated fluid collection    Reviewed the images and clinical history provided by the ED and it looks to me that there is a collection in deep in the periumbilical region with associated stranding.     Will have pateint transferred for continued evaluation.  - Keep NPO with IVF  - Start Zosyn. If it is a  - PRN pain medications and antiemetics  - Sliding scale      Simón Milton  Surgery PGY V

## 2022-03-25 NOTE — PLAN OF CARE
Pt is AAOx4. Room air. Tele maintained.   No falls or new injuries reported during shift, safety precautions maintained.     Problem: Adult Inpatient Plan of Care  Goal: Plan of Care Review  Outcome: Ongoing, Progressing     Problem: Adult Inpatient Plan of Care  Goal: Optimal Comfort and Wellbeing  Outcome: Ongoing, Progressing

## 2022-03-25 NOTE — H&P
Patient ID: Vahe Molina is a 46 y.o. male.  Admitted overnight from ED    Chief Complaint: Abdominal Pain (Pt reports abd pain x2 days, seen at urgent care, no relief with Bentyl )      HPI:  HPI   46-year-old black male with diabetes presented to the ER with a four-day history of abdominal pain.  He said the pain started on Monday 30 minutes after having some pizza and he went to an urgent care with epigastric vague pressure pain.  He watched at home for 48 hours did not relieve any came into the emergency room where he was given a GI consult with improvement however CT scan demonstrated concern for ruptured appendicitis contained.  He has recently been feeling better he is hungry has been passing gas having bowel movements no nausea no vomiting as abdominal pain is present however it is mild.  He has never had this before.  He has had a colonoscopy over 5 years ago he said which was normal  At this point I explained he will remain NPO for today due to the findings he can have ice chips he is okay with this he understands this  No fevers no chills    Review of Systems   Constitutional: Negative for chills and fever.   HENT: Negative.    Eyes: Negative.    Respiratory: Negative for cough, chest tightness and shortness of breath.    Cardiovascular: Negative.    Gastrointestinal: Positive for abdominal pain (mild epigastric pain). Negative for blood in stool, constipation, diarrhea, nausea and vomiting.   Endocrine: Negative for cold intolerance and heat intolerance.   Genitourinary: Negative.    Musculoskeletal: Negative.    Skin: Negative.  Negative for rash.   Neurological: Positive for headaches. Negative for dizziness, syncope and light-headedness.   Psychiatric/Behavioral: Negative for agitation, confusion and hallucinations.       Current Facility-Administered Medications   Medication Dose Route Frequency Provider Last Rate Last Admin    acetaminophen tablet 650 mg  650 mg Oral Q8H PRN Courtney BARRETO  [place], NP        dextrose 50% injection 12.5 g  12.5 g Intravenous PRN Courtney Burnham NP        glucagon (human recombinant) injection 1 mg  1 mg Intramuscular PRN Courtney Burnham NP        insulin aspart U-100 pen 0-5 Units  0-5 Units Subcutaneous Q6H PRN Courtney Burnham NP        iohexol (OMNIPAQUE) oral solution 15 mL  15 mL Oral Once Beltran Edwards MD        lactated ringers infusion   Intravenous Continuous Courtney Burnham  mL/hr at 03/25/22 0533 Restarted at 03/25/22 0533    morphine injection 2 mg  2 mg Intravenous Q4H PRN Courtney Burnham NP        morphine injection 4 mg  4 mg Intravenous Q4H PRN Courtney Burnham NP        ondansetron injection 4 mg  4 mg Intravenous Q8H PRN Courtney Burnham NP        piperacillin-tazobactam 4.5 g in dextrose 5 % 100 mL IVPB (ready to mix system)  4.5 g Intravenous Q8H Courtney Burnham NP   Stopped at 03/25/22 0608    sodium chloride 0.9% flush 10 mL  10 mL Intravenous PRN Courtney Burnham NP           Review of patient's allergies indicates:  No Known Allergies    Past Medical History:   Diagnosis Date    Diabetes mellitus        Past Surgical History:   Procedure Laterality Date    left knee sx         Family History   Problem Relation Age of Onset    Diabetes Mother        Social History     Socioeconomic History    Marital status:    Tobacco Use    Smoking status: Never Smoker    Smokeless tobacco: Never Used   Substance and Sexual Activity    Alcohol use: Never    Drug use: Not Currently    Sexual activity: Not Currently       Vitals:    03/25/22 0717   BP: 123/78   Pulse: 76   Resp: 17   Temp: 98.2 °F (36.8 °C)       Physical Exam  Constitutional:       General: He is not in acute distress.     Appearance: He is well-developed. He is not diaphoretic.   HENT:      Head: Normocephalic and atraumatic.   Eyes:      Conjunctiva/sclera: Conjunctivae normal.      Pupils: Pupils are equal, round, and reactive to light.    Cardiovascular:      Rate and Rhythm: Normal rate and regular rhythm.      Heart sounds: Normal heart sounds. No murmur heard.    No friction rub. No gallop.   Pulmonary:      Effort: Pulmonary effort is normal. No respiratory distress.      Breath sounds: Normal breath sounds. No stridor. No wheezing.   Abdominal:      General: Bowel sounds are normal. There is no distension.      Palpations: Abdomen is soft.      Tenderness: There is abdominal tenderness (mild epigastric pain no guarding).   Musculoskeletal:         General: Normal range of motion.      Cervical back: Normal range of motion and neck supple.   Skin:     General: Skin is warm and dry.      Findings: No rash.   Neurological:      Mental Status: He is alert and oriented to person, place, and time.      Cranial Nerves: No cranial nerve deficit.   Psychiatric:         Behavior: Behavior normal.       Labs reviewed  Wbc 13.2  cmp reviewed: t bili 1.9    CT:  3/24/22  Impression:     1. Grossly abnormal appearance with extensive inflammatory changes seen involving mid and distal small bowel loops in the right lower quadrant and mid to lower abdomen.  Questionable markedly dilated appendix seen measuring 3 cm versus abnormal appearance of distal small bowel loops.  No free air seen, however there is appearance of developing multilocular abscess in the mid to lower abdomen which could potentially relate to contained perforation.  Acute appendicitis versus reactive or other infectious/inflammatory enteritis to be considered.  Potential neoplastic process not excluded.  Surgical consultation is advised.    Assessment & Plan:    46 yr old black male w diabetes and abdominal pain, ct evident of ruptured but contained appendicitis    Admit  ivf  Npo  Zosyn  Trend labs  Ice chips ok

## 2022-03-25 NOTE — NURSING
Bedside handoff report received from offgoing nurse. Pt lying in bed, NAD noted. Fall/safety precautions maintained. Communication board updated. Will continue to monitor.

## 2022-03-25 NOTE — CONSULTS
IR consulted on this patient with suspected ruptured appendicitis.  Patient presented with abdominal pain, originally in the RLQ, then involving the epigastric region.  CT scan demonstrates extensive inflammatory changes in the mid abdomen/RLQ with what appears to be a dilated appendix with possible developing multilocular abscess. There are a few small areas of fluid collection which are not well organized and not easily accessible due to surrounding bowel loops.  Discussed with Dr. Bernal and informed him that this case is not amenable to CT guided percutaneous intervention at this time.    Crispin Salazar MD  Interventional Radiology

## 2022-03-25 NOTE — NURSING
Pt arrived to 335. Pt is AAOx4. Milton WOODS made aware pt arrived to hospital. Pain 3/10 at this time.

## 2022-03-25 NOTE — PLAN OF CARE
[facility]  Initial Discharge Assessment       Primary Care Provider: Primary Doctor No    Admission Diagnosis: Epigastric abdominal pain [R10.13]  Intra-abdominal infection [B99.9]  Sepsis, due to unspecified organism, unspecified whether acute organ dysfunction present [A41.9]    Admission Date: 3/24/2022  Expected Discharge Date:     Discharge Barriers Identified: (P) None    Payor: MEDICAID / [facility] / Product Type: Managed Medicaid /     Extended Emergency Contact Information  Primary Emergency Contact: Morelia Molina  Mobile Phone: [phone]  Relation: Relative  Preferred language: English   needed? No  Secondary Emergency Contact: GinaParis  Address: [address] [place] [address] [facility]  Home Phone: [phone]  Mobile Phone: [phone]  Relation: Spouse    Discharge Plan A: (P) Home  Discharge Plan B: (P) Home      [address] [address]  Phone: [phone] Fax: [phone]      Initial Assessment (most recent)       Adult Discharge Assessment - 03/25/22 1455          Discharge Assessment    Assessment Type Discharge Planning Assessment (P)      Confirmed/corrected address, phone number and insurance Yes (P)      Confirmed Demographics Correct on Facesheet (P)      Source of Information patient;health record (P)      Communicated CHRISTIAN with patient/caregiver Date not available/Unable to determine (P)      Lives With spouse (P)      Facility Arrived From: Home (P)      Do you expect to return to your current living situation? Yes (P)      Do you have help at home or someone to help you manage your care at home? Yes (P)      Who are your caregiver(s) and their phone number(s)? Paris Fuentes Spouse [phone] (P)      Prior to hospitilization cognitive status: Alert/Oriented (P)      Current cognitive status:  Alert/Oriented (P)      Walking or Climbing Stairs Difficulty none (P)      Dressing/Bathing Difficulty none (P)      Equipment Currently Used at Home none (P)      Readmission within 30 days? No (P)      Patient currently being followed by outpatient case management? No (P)      Do you currently have service(s) that help you manage your care at home? No (P)      Do you take prescription medications? Yes (P)      Do you have prescription coverage? Yes (P)      Coverage Medicaid (P)      Do you have any problems affording any of your prescribed medications? No (P)      Is the patient taking medications as prescribed? yes (P)      Who is going to help you get home at discharge? Paris Fuentes Spouse [phone] (P)      How do you get to doctors appointments? car, drives self;family or friend will provide (P)      Are you on dialysis? No (P)      Do you take coumadin? No (P)      Discharge Plan A Home (P)      Discharge Plan B Home (P)      DME Needed Upon Discharge  none (P)      Discharge Barriers Identified None (P)

## 2022-03-26 LAB
ANION GAP SERPL CALC-SCNC: 9 MMOL/L (ref 8–16)
BASOPHILS # BLD AUTO: 0.03 K/UL (ref 0–0.2)
BASOPHILS NFR BLD: 0.3 % (ref 0–1.9)
BUN SERPL-MCNC: 8 MG/DL (ref 6–20)
CALCIUM SERPL-MCNC: 9.4 MG/DL (ref 8.7–10.5)
CHLORIDE SERPL-SCNC: 106 MMOL/L (ref 95–110)
CO2 SERPL-SCNC: 22 MMOL/L (ref 23–29)
CREAT SERPL-MCNC: 1.1 MG/DL (ref 0.5–1.4)
DIFFERENTIAL METHOD: ABNORMAL
EOSINOPHIL # BLD AUTO: 0.2 K/UL (ref 0–0.5)
EOSINOPHIL NFR BLD: 1.9 % (ref 0–8)
ERYTHROCYTE [DISTWIDTH] IN BLOOD BY AUTOMATED COUNT: 11.9 % (ref 11.5–14.5)
EST. GFR  (AFRICAN AMERICAN): >60 ML/MIN/1.73 M^2
EST. GFR  (NON AFRICAN AMERICAN): >60 ML/MIN/1.73 M^2
GLUCOSE SERPL-MCNC: 118 MG/DL (ref 70–110)
HCT VFR BLD AUTO: 34.3 % (ref 40–54)
HGB BLD-MCNC: 11.1 G/DL (ref 14–18)
IMM GRANULOCYTES # BLD AUTO: 0.04 K/UL (ref 0–0.04)
IMM GRANULOCYTES NFR BLD AUTO: 0.4 % (ref 0–0.5)
LYMPHOCYTES # BLD AUTO: 1.8 K/UL (ref 1–4.8)
LYMPHOCYTES NFR BLD: 17.5 % (ref 18–48)
MCH RBC QN AUTO: 29.1 PG (ref 27–31)
MCHC RBC AUTO-ENTMCNC: 32.4 G/DL (ref 32–36)
MCV RBC AUTO: 90 FL (ref 82–98)
MONOCYTES # BLD AUTO: 1.1 K/UL (ref 0.3–1)
MONOCYTES NFR BLD: 10.8 % (ref 4–15)
NEUTROPHILS # BLD AUTO: 7 K/UL (ref 1.8–7.7)
NEUTROPHILS NFR BLD: 69.1 % (ref 38–73)
NRBC BLD-RTO: 0 /100 WBC
PLATELET # BLD AUTO: 310 K/UL (ref 150–450)
PMV BLD AUTO: 9.7 FL (ref 9.2–12.9)
POCT GLUCOSE: 156 MG/DL (ref 70–110)
POTASSIUM SERPL-SCNC: 3.9 MMOL/L (ref 3.5–5.1)
RBC # BLD AUTO: 3.82 M/UL (ref 4.6–6.2)
SODIUM SERPL-SCNC: 137 MMOL/L (ref 136–145)
WBC # BLD AUTO: 10.16 K/UL (ref 3.9–12.7)

## 2022-03-26 PROCEDURE — 99232 SBSQ HOSP IP/OBS MODERATE 35: CPT | Mod: ,,, | Performed by: SURGERY

## 2022-03-26 PROCEDURE — 25000003 PHARM REV CODE 250: Performed by: NURSE PRACTITIONER

## 2022-03-26 PROCEDURE — 63600175 PHARM REV CODE 636 W HCPCS: Performed by: NURSE PRACTITIONER

## 2022-03-26 PROCEDURE — 36415 COLL VENOUS BLD VENIPUNCTURE: CPT | Performed by: SURGERY

## 2022-03-26 PROCEDURE — 99232 PR SUBSEQUENT HOSPITAL CARE,LEVL II: ICD-10-PCS | Mod: ,,, | Performed by: SURGERY

## 2022-03-26 PROCEDURE — 21400001 HC TELEMETRY ROOM

## 2022-03-26 PROCEDURE — 80048 BASIC METABOLIC PNL TOTAL CA: CPT | Performed by: SURGERY

## 2022-03-26 PROCEDURE — 85025 COMPLETE CBC W/AUTO DIFF WBC: CPT | Performed by: SURGERY

## 2022-03-26 RX ADMIN — PIPERACILLIN AND TAZOBACTAM 4.5 G: 4; .5 INJECTION, POWDER, LYOPHILIZED, FOR SOLUTION INTRAVENOUS; PARENTERAL at 01:03

## 2022-03-26 RX ADMIN — SODIUM CHLORIDE, SODIUM LACTATE, POTASSIUM CHLORIDE, AND CALCIUM CHLORIDE: .6; .31; .03; .02 INJECTION, SOLUTION INTRAVENOUS at 01:03

## 2022-03-26 RX ADMIN — PIPERACILLIN AND TAZOBACTAM 4.5 G: 4; .5 INJECTION, POWDER, LYOPHILIZED, FOR SOLUTION INTRAVENOUS; PARENTERAL at 06:03

## 2022-03-26 RX ADMIN — PIPERACILLIN AND TAZOBACTAM 4.5 G: 4; .5 INJECTION, POWDER, LYOPHILIZED, FOR SOLUTION INTRAVENOUS; PARENTERAL at 09:03

## 2022-03-26 NOTE — NURSING
Bedside handoff report given to oncoming nurse. Pt lying in bed, NAD noted. Fall/safety precautions maintained. Chart check complete.

## 2022-03-26 NOTE — PROGRESS NOTES
Surgery Progress Note    Vahe Molina is a 46 y.o. year old male in hospital for abdominal pain/likely contained appendiceal rupture.  His pain is gone. Wbc improved. Normal flatus, liquid BMs.  No f/c/n/v/sob/cp    ROS:  Negative except above    PE:  Vitals:    03/25/22 1656 03/25/22 2228 03/26/22 0001 03/26/22 0441   BP: 134/88 (!) 137/90 122/77 (!) 137/90   BP Location: Right arm Right arm Right arm Right arm   Patient Position: Lying Lying Lying Lying   Pulse: 77 90 69 81   Resp: 18 16 18 18   Temp: 99.3 °F (37.4 °C) 98.5 °F (36.9 °C) 98.4 °F (36.9 °C) 98.4 °F (36.9 °C)   TempSrc: Oral Oral Oral Oral   SpO2: 97% 100% 96% 97%   Weight:       Height:         NAD  No belabored breathing  No skin changes  Abd soft nt nd  Can feel edematous mass in supraumbilical area consistent with the inflammatory process seen on CT.    A/P:  Vahe Molina is a 46 y.o. year old male  with contained appendiceal rupture    -cld  -decrease ivf  -ambulate  -continue zosyn  -will advance diet as tolerated. Preliminary plan is to advance to regular diet and if he continues to improve will discharge tomorrow with plan for interval appendectomy. No need for interval imaging unless he decompensates    Pancho Bernal  General Surgery - [facility]  3/26/2022

## 2022-03-26 NOTE — PLAN OF CARE
Pt is AAOx4. Room air. Tele maintained.   No falls or injuries reported during shift, safety precautions maintained.     Problem: Adult Inpatient Plan of Care  Goal: Plan of Care Review  Outcome: Ongoing, Progressing     Problem: Adult Inpatient Plan of Care  Goal: Optimal Comfort and Wellbeing  Outcome: Ongoing, Progressing

## 2022-03-26 NOTE — PLAN OF CARE
Problem: Pain Acute  Goal: Acceptable Pain Control and Functional Ability  Outcome: Ongoing, Progressing  Intervention: Develop Pain Management Plan  Flowsheets (Taken 3/26/2022 1744)  Pain Management Interventions:   care clustered   medication offered   medication offered but refused   position adjusted  Intervention: Prevent or Manage Pain  Flowsheets (Taken 3/26/2022 1744)  Medication Review/Management: medications reviewed  Intervention: Optimize Psychosocial Wellbeing  Flowsheets (Taken 3/26/2022 1744)  Diversional Activities:   [employer]   television

## 2022-03-27 VITALS
SYSTOLIC BLOOD PRESSURE: 140 MMHG | DIASTOLIC BLOOD PRESSURE: 91 MMHG | TEMPERATURE: 99 F | WEIGHT: 210 LBS | HEART RATE: 66 BPM | RESPIRATION RATE: 18 BRPM | BODY MASS INDEX: 28.44 KG/M2 | HEIGHT: 72 IN | OXYGEN SATURATION: 96 %

## 2022-03-27 PROBLEM — K35.33 APPENDICEAL ABSCESS: Status: ACTIVE | Noted: 2022-03-27

## 2022-03-27 LAB
POCT GLUCOSE: 132 MG/DL (ref 70–110)
POCT GLUCOSE: 159 MG/DL (ref 70–110)

## 2022-03-27 PROCEDURE — 25000003 PHARM REV CODE 250: Performed by: NURSE PRACTITIONER

## 2022-03-27 PROCEDURE — 99238 PR HOSPITAL DISCHARGE DAY,<30 MIN: ICD-10-PCS | Mod: ,,, | Performed by: SURGERY

## 2022-03-27 PROCEDURE — 99238 HOSP IP/OBS DSCHRG MGMT 30/<: CPT | Mod: ,,, | Performed by: SURGERY

## 2022-03-27 PROCEDURE — 63600175 PHARM REV CODE 636 W HCPCS: Performed by: NURSE PRACTITIONER

## 2022-03-27 RX ORDER — HYDROCODONE BITARTRATE AND ACETAMINOPHEN 5; 325 MG/1; MG/1
1 TABLET ORAL EVERY 6 HOURS PRN
Qty: 15 TABLET | Refills: 0 | Status: SHIPPED | OUTPATIENT
Start: 2022-03-27 | End: 2022-04-20 | Stop reason: CLARIF

## 2022-03-27 RX ORDER — AMOXICILLIN AND CLAVULANATE POTASSIUM 875; 125 MG/1; MG/1
1 TABLET, FILM COATED ORAL EVERY 12 HOURS
Qty: 20 TABLET | Refills: 0 | Status: SHIPPED | OUTPATIENT
Start: 2022-03-27 | End: 2022-04-06

## 2022-03-27 RX ADMIN — PIPERACILLIN AND TAZOBACTAM 4.5 G: 4; .5 INJECTION, POWDER, LYOPHILIZED, FOR SOLUTION INTRAVENOUS; PARENTERAL at 09:03

## 2022-03-27 RX ADMIN — PIPERACILLIN AND TAZOBACTAM 4.5 G: 4; .5 INJECTION, POWDER, LYOPHILIZED, FOR SOLUTION INTRAVENOUS; PARENTERAL at 02:03

## 2022-03-27 NOTE — PLAN OF CARE
03/27/22 1229   Post-Acute Status   Post-Acute Authorization Other  (Home; Surgery follow-up)   Coverage Medicaid   Other Status No Post-Acute Service Needs   Hospital Resources/Appts/Education Provided Provided patient/caregiver with written discharge plan information;Appointments scheduled and added to AVS;Appointments scheduled by Navigator/Coordinator;Provided education on problems/symptoms using teachback   Discharge Delays None known at this time   Discharge Plan   Discharge Plan A Home with family  (Follow-Up)   Discharge Plan B Home with family  (Follow-up)

## 2022-03-27 NOTE — PROGRESS NOTES
[facility]: [facility]  Case Management Services    WRITTEN HEALTHCARE DISCHARGE INFORMATION      Things that YOU are RESPONSIBLE for to Manage Your Care At Home:    WRITTEN DISCHARGE INSTURCTIONS  These are your WRITTEN DISCHARGE INSTRUCTIONS from your hospital stay. Please be sure to know and understand that you are responsible for the following, so that you will be able to better manage your care at home:   1.  all medications that have been called in for you or those that were written on prescription paper and provided to you to get filled.  2. BE SURE TO TAKE YOUR MEDICATION AS PRESCRIBED.  3. Be sure to attend your follow-up appointments that were scheduled for you or to schedule any appointments that you will be scheduling.     If you are unable to make your follow up appointments, please call the number listed and reschedule this appointment.      ________[HELP AT HOME]________    Be sure that you will have someone to help you at home during your recovery.     Experiencing any SIGNS or SYMPTOMS: YOU CAN     Schedule a same day appointment with your Primary Care Doctor or  you can call [facility] On Call Nurse Care Line for 24/7 assistance at [phone]     If you are experience any signs or symptoms that have become severe, Call 911 and come to your nearest Emergency Room.     Thank you for choosing [facility] and allowing us to care for you.     From your care management team:   You should receive a call from [facility] Discharge Department within 48-72 hours to help manage your care after discharge. Please try to make sure that you answer your phone for this important phone call.    Your follow-up appointments have been made according to your preferences. The following are your scheduled appointments or those that you will need to schedule for yourself.    FOLLOW-UPS   Follow-up Information     Pancho Bernal MD. Schedule an appointment as soon as possible for a visit.    Specialties:  General Surgery, Surgery  Why: Out-Patient Surgery Follow-Up in 1 week.  Contact information:  [facility] [place] [id number] [phone]

## 2022-03-27 NOTE — PROGRESS NOTES
Patient is ready and clear for discharge from Case Management perspective; informed patients nurse, Maribell and discussed discharge. Reviewed with and provided patient with Written Discharge Instructions.

## 2022-03-27 NOTE — PROGRESS NOTES
Surgery Progress Note    Vahe Molina is a 46 y.o. year old male in hospital for abdominal pain/likely contained appendiceal rupture.  No issues with diet. Ready for discharge    ROS:  Negative except above    PE:  Vitals:    03/26/22 2338 03/27/22 0425 03/27/22 0731 03/27/22 1112   BP: 136/76 134/84 134/87 (!) 140/91   BP Location: Right arm Right arm Right arm Right arm   Patient Position: Lying Lying Lying Lying   Pulse: 83 63 71 66   Resp: 18 18 16 18   Temp: 99.2 °F (37.3 °C) 98.3 °F (36.8 °C) 98.5 °F (36.9 °C) 98.7 °F (37.1 °C)   TempSrc: Oral Oral Oral Oral   SpO2: 95% (!) 94% 95% 96%   Weight:       Height:         NAD  No belabored breathing  No skin changes  Abd soft nt nd  A/P:  Vahe Molina is a 46 y.o. year old male  with contained appendiceal rupture    -discharge home. rtc Friday. See discharge summary    Pancho Bernal  General Surgery - [facility]  3/27/2022

## 2022-03-27 NOTE — NURSING
Patient report received. Patient alert, oriented and able to make needs known. No complaints of pain nor discomfort. IV fluids and IV antibiotics infusing. Will continue with current plan of care.

## 2022-03-27 NOTE — PLAN OF CARE
[facility] - Telemetry  Discharge Final Note    Primary Care Provider: Primary Doctor No    Expected Discharge Date: 3/27/2022    Final Discharge Note (most recent)     Final Note - 03/27/22 1230        Final Note    Assessment Type Final Discharge Note     Anticipated Discharge Disposition Home or Self Care     Hospital Resources/Appts/Education Provided Appointments scheduled by Navigator/Coordinator;Provided education on problems/symptoms using teachback;Appointments scheduled and added to AVS        Post-Acute Status    Post-Acute Authorization Other   Home; follow-up    Coverage Medicaid     Other Status No Post-Acute Service Needs     Discharge Delays None known at this time                 Important Message from Medicare             Contact Info     Pancho Bernal MD   Specialty: General Surgery, Surgery    [address] [facility] [address]  [place] [address]   Phone: [phone]       Next Steps: Schedule an appointment as soon as possible for a visit    Instructions: Out-Patient Surgery Follow-Up in 1 week.

## 2022-03-27 NOTE — DISCHARGE SUMMARY
[facility] - Telemetry  Discharge Summary      Patient Name: Vahe Molina  MRN: [id number]  Admission Date: 3/24/2022  Hospital Length of Stay: 3 days  Discharge Date and Time:  03/27/2022 11:28 AM  Attending Physician: Levi Arreola MD   Discharging Provider: Meredith Bernal MD  Primary Care Provider: Primary Doctor No    HPI/hospital course:  46 yr old black male admitted with CT evidence of contained ruptured appendicitis, felt OK on admission and IR felt no drainable abscess.  He did well with antibiotics and on HD#2 started on a diet. By 3/27/2022 he was doing well with regular diet, no pain, and no sepsis.  Plans made for f/u and interval appendectomy     Indwelling Lines/Drains at time of discharge:   Lines/Drains/Airways     None               Consults:   Consults (From admission, onward)        Status Ordering Provider     Inpatient consult to Interventional Radiology  Once        Provider:  (Not yet assigned)    Completed MEREDITH BERNAL          Significant Diagnostic Studies: Labs:   CBC   Recent Labs   Lab 03/26/22  0835   WBC 10.16   HGB 11.1*   HCT 34.3*        Radiology: CT scan: CT ABDOMEN PELVIS WITH CONTRAST:   Results for orders placed or performed during the hospital encounter of 03/24/22   CT Abdomen Pelvis With Contrast    Narrative    EXAMINATION:  CT ABDOMEN PELVIS WITH CONTRAST    CLINICAL HISTORY:  Abdominal abscess/infection suspected;    TECHNIQUE:  Low dose axial images, sagittal and coronal reformations were obtained from the lung bases to the pubic symphysis following the IV administration of 100 mL of Omnipaque 350.    COMPARISON:  None.    FINDINGS:  The visualized portion of the heart is unremarkable.  Minimal atelectatic changes are seen at the lung bases.  No focal consolidation or pleural effusion seen.    Liver is enlarged measuring 19.5 cm.  No significant focal hepatic abnormalities are seen.  There is no intra-or extrahepatic biliary ductal dilatation.  The  gallbladder is unremarkable.  High density material or contrast are seen within the stomach and small bowel loops.  The stomach, pancreas, spleen, and adrenal glands are unremarkable.    Kidneys enhance normally with no evidence of hydronephrosis.  Ureters are difficult to track distally.  Urinary bladder is nondistended.  Prostate is unremarkable.    Grossly abnormal appearance seen of mid and distal small bowel loops in the mid to lower abdomen and right lower quadrant.  Questionable markedly dilated appendix seen measuring 3 cm.  Extensive inflammatory changes are seen in the region with questionable developing multilocular abscess.  This is difficult to accurately measure.  No convincing CT evidence of bowel obstruction at this time.  There is diverticulosis seen involving the distal descending and proximal sigmoid colon.  Trace peritoneal free fluid is seen.  No free air or perforation seen.    Aorta tapers normally.    No acute osseous abnormality identified. Small umbilical hernia is seen.      Impression    1. Grossly abnormal appearance with extensive inflammatory changes seen involving mid and distal small bowel loops in the right lower quadrant and mid to lower abdomen.  Questionable markedly dilated appendix seen measuring 3 cm versus abnormal appearance of distal small bowel loops.  No free air seen, however there is appearance of developing multilocular abscess in the mid to lower abdomen which could potentially relate to contained perforation.  Acute appendicitis versus reactive or other infectious/inflammatory enteritis to be considered.  Potential neoplastic process not excluded.  Surgical consultation is advised.  2. Additional findings as detailed above.  This report was flagged in Epic as abnormal.      Electronically signed by: Bautista Rao MD  Date:    03/24/2022  Time:    20:39       Pending Diagnostic Studies:     None        Final Active Diagnoses:    Diagnosis Date Noted POA    PRINCIPAL  PROBLEM:  Appendiceal abscess [K35.33] 03/27/2022 Unknown      Problems Resolved During this Admission:      Discharged Condition: good    Disposition: Home or Self Care    Follow Up: josé miguel dumont 1 wk    Patient Instructions:      Diet Adult Regular     Notify your health care provider if you experience any of the following:  temperature >100.4     Notify your health care provider if you experience any of the following:  persistent nausea and vomiting or diarrhea     Notify your health care provider if you experience any of the following:  severe uncontrolled pain     Notify your health care provider if you experience any of the following:  redness, tenderness, or signs of infection (pain, swelling, redness, odor or green/yellow discharge around incision site)     No dressing needed     Activity as tolerated   Order Comments: Limit exercise until clinic visit     Medications:  Reconciled Home Medications:      Medication List      START taking these medications    amoxicillin-clavulanate 875-125mg 875-125 mg per tablet  Commonly known as: AUGMENTIN  Take 1 tablet by mouth every 12 (twelve) hours. for 10 days     HYDROcodone-acetaminophen 5-325 mg per tablet  Commonly known as: NORCO  Take 1 tablet by mouth every 6 (six) hours as needed for Pain.        CONTINUE taking these medications    amino acids Powd  Take by mouth.     blood sugar diagnostic Strp  Test blood sugar four times daily     blood-glucose meter Misc  Test Blood sugar four times daily     dicyclomine 20 mg tablet  Commonly known as: BENTYL  Take 20 mg by mouth every 6 (six) hours as needed.     fish oil-omega-3 fatty acids 300-1,000 mg capsule  Take by mouth once daily.     glimepiride 1 MG tablet  Commonly known as: AMARYL  Take 1 mg by mouth every morning.     losartan 50 MG tablet  Commonly known as: COZAAR  Take 50 mg by mouth once daily.     simethicone 125 mg Tab  Take 125 mg by mouth every 6 (six) hours as needed.          Time spent on the  discharge of patient: 20 minutes    Pancho Bernal MD  [facility] - Telemetry

## 2022-03-28 ENCOUNTER — TELEPHONE (OUTPATIENT)
Dept: SURGERY | Facility: CLINIC | Age: 47
End: 2022-03-28
Payer: MEDICAID

## 2022-03-28 LAB
BACTERIA BLD CULT: ABNORMAL

## 2022-03-29 LAB — BACTERIA BLD CULT: NORMAL

## 2022-04-01 ENCOUNTER — OFFICE VISIT (OUTPATIENT)
Dept: SURGERY | Facility: CLINIC | Age: 47
End: 2022-04-01
Payer: MEDICAID

## 2022-04-01 VITALS
HEIGHT: 72 IN | HEART RATE: 76 BPM | WEIGHT: 210.13 LBS | BODY MASS INDEX: 28.46 KG/M2 | SYSTOLIC BLOOD PRESSURE: 125 MMHG | DIASTOLIC BLOOD PRESSURE: 87 MMHG

## 2022-04-01 DIAGNOSIS — K35.33 APPENDICEAL ABSCESS: Primary | ICD-10-CM

## 2022-04-01 PROCEDURE — 3079F DIAST BP 80-89 MM HG: CPT | Mod: CPTII,,, | Performed by: SURGERY

## 2022-04-01 PROCEDURE — 3008F BODY MASS INDEX DOCD: CPT | Mod: CPTII,,, | Performed by: SURGERY

## 2022-04-01 PROCEDURE — 1111F PR DISCHARGE MEDS RECONCILED W/ CURRENT OUTPATIENT MED LIST: ICD-10-PCS | Mod: CPTII,,, | Performed by: SURGERY

## 2022-04-01 PROCEDURE — 99213 OFFICE O/P EST LOW 20 MIN: CPT | Mod: PBBFAC | Performed by: SURGERY

## 2022-04-01 PROCEDURE — 3074F SYST BP LT 130 MM HG: CPT | Mod: CPTII,,, | Performed by: SURGERY

## 2022-04-01 PROCEDURE — 3079F PR MOST RECENT DIASTOLIC BLOOD PRESSURE 80-89 MM HG: ICD-10-PCS | Mod: CPTII,,, | Performed by: SURGERY

## 2022-04-01 PROCEDURE — 99213 PR OFFICE/OUTPT VISIT, EST, LEVL III, 20-29 MIN: ICD-10-PCS | Mod: S$PBB,,, | Performed by: SURGERY

## 2022-04-01 PROCEDURE — 3008F PR BODY MASS INDEX (BMI) DOCUMENTED: ICD-10-PCS | Mod: CPTII,,, | Performed by: SURGERY

## 2022-04-01 PROCEDURE — 3074F PR MOST RECENT SYSTOLIC BLOOD PRESSURE < 130 MM HG: ICD-10-PCS | Mod: CPTII,,, | Performed by: SURGERY

## 2022-04-01 PROCEDURE — 4010F ACE/ARB THERAPY RXD/TAKEN: CPT | Mod: CPTII,,, | Performed by: SURGERY

## 2022-04-01 PROCEDURE — 1111F DSCHRG MED/CURRENT MED MERGE: CPT | Mod: CPTII,,, | Performed by: SURGERY

## 2022-04-01 PROCEDURE — 4010F PR ACE/ARB THEARPY RXD/TAKEN: ICD-10-PCS | Mod: CPTII,,, | Performed by: SURGERY

## 2022-04-01 PROCEDURE — 1159F PR MEDICATION LIST DOCUMENTED IN MEDICAL RECORD: ICD-10-PCS | Mod: CPTII,,, | Performed by: SURGERY

## 2022-04-01 PROCEDURE — 1159F MED LIST DOCD IN RCRD: CPT | Mod: CPTII,,, | Performed by: SURGERY

## 2022-04-01 PROCEDURE — 99213 OFFICE O/P EST LOW 20 MIN: CPT | Mod: S$PBB,,, | Performed by: SURGERY

## 2022-04-01 PROCEDURE — 99999 PR PBB SHADOW E&M-EST. PATIENT-LVL III: ICD-10-PCS | Mod: PBBFAC,,, | Performed by: SURGERY

## 2022-04-01 PROCEDURE — 99999 PR PBB SHADOW E&M-EST. PATIENT-LVL III: CPT | Mod: PBBFAC,,, | Performed by: SURGERY

## 2022-04-01 NOTE — PROGRESS NOTES
Surgery Clinic Note    Vahe Molina is a 46 y.o. year old male in clinic today for follow up of intraabdominal infection?/contained ruptured appendicitis. He feels great. Tolerating diet, having BM's  No f/c/n/v/sob/cp    ROS:  Negative except above    Imaging:  3/24/22  Impression:     1. Grossly abnormal appearance with extensive inflammatory changes seen involving mid and distal small bowel loops in the right lower quadrant and mid to lower abdomen.  Questionable markedly dilated appendix seen measuring 3 cm versus abnormal appearance of distal small bowel loops.  No free air seen, however there is appearance of developing multilocular abscess in the mid to lower abdomen which could potentially relate to contained perforation.  Acute appendicitis versus reactive or other infectious/inflammatory enteritis to be considered.  Potential neoplastic process not excluded.  Surgical consultation is advised.    PE:  Vitals:    04/01/22 0923   BP: 125/87   Pulse: 76     NAD  No belabored breathing  No skin changes  Abd soft nt nd    A/P:  Vahe Molina is a 46 y.o. year old male s/p admission with contained ruptured appendicitis    -finish antibiotic course  -rtc few wks tp discuss/schedule interval appendectomy    Pancho Bernal  General Surgery - [facility]  4/1/2022

## 2022-04-08 ENCOUNTER — PATIENT MESSAGE (OUTPATIENT)
Dept: SURGERY | Facility: CLINIC | Age: 47
End: 2022-04-08
Payer: MEDICAID

## 2022-04-11 ENCOUNTER — OFFICE VISIT (OUTPATIENT)
Dept: SURGERY | Facility: CLINIC | Age: 47
End: 2022-04-11
Payer: MEDICAID

## 2022-04-11 VITALS
BODY MASS INDEX: 28.46 KG/M2 | DIASTOLIC BLOOD PRESSURE: 85 MMHG | HEART RATE: 84 BPM | WEIGHT: 210.13 LBS | HEIGHT: 72 IN | SYSTOLIC BLOOD PRESSURE: 133 MMHG

## 2022-04-11 DIAGNOSIS — K35.32 RUPTURED APPENDICITIS: ICD-10-CM

## 2022-04-11 DIAGNOSIS — K35.33 APPENDICEAL ABSCESS: Primary | ICD-10-CM

## 2022-04-11 PROCEDURE — 99214 OFFICE O/P EST MOD 30 MIN: CPT | Mod: S$PBB,,, | Performed by: SURGERY

## 2022-04-11 PROCEDURE — 4010F PR ACE/ARB THEARPY RXD/TAKEN: ICD-10-PCS | Mod: CPTII,,, | Performed by: SURGERY

## 2022-04-11 PROCEDURE — 1111F PR DISCHARGE MEDS RECONCILED W/ CURRENT OUTPATIENT MED LIST: ICD-10-PCS | Mod: CPTII,,, | Performed by: SURGERY

## 2022-04-11 PROCEDURE — 99213 OFFICE O/P EST LOW 20 MIN: CPT | Mod: PBBFAC | Performed by: SURGERY

## 2022-04-11 PROCEDURE — 3075F SYST BP GE 130 - 139MM HG: CPT | Mod: CPTII,,, | Performed by: SURGERY

## 2022-04-11 PROCEDURE — 3079F DIAST BP 80-89 MM HG: CPT | Mod: CPTII,,, | Performed by: SURGERY

## 2022-04-11 PROCEDURE — 3075F PR MOST RECENT SYSTOLIC BLOOD PRESS GE 130-139MM HG: ICD-10-PCS | Mod: CPTII,,, | Performed by: SURGERY

## 2022-04-11 PROCEDURE — 99999 PR PBB SHADOW E&M-EST. PATIENT-LVL III: CPT | Mod: PBBFAC,,, | Performed by: SURGERY

## 2022-04-11 PROCEDURE — 99214 PR OFFICE/OUTPT VISIT, EST, LEVL IV, 30-39 MIN: ICD-10-PCS | Mod: S$PBB,,, | Performed by: SURGERY

## 2022-04-11 PROCEDURE — 3008F BODY MASS INDEX DOCD: CPT | Mod: CPTII,,, | Performed by: SURGERY

## 2022-04-11 PROCEDURE — 1111F DSCHRG MED/CURRENT MED MERGE: CPT | Mod: CPTII,,, | Performed by: SURGERY

## 2022-04-11 PROCEDURE — 3079F PR MOST RECENT DIASTOLIC BLOOD PRESSURE 80-89 MM HG: ICD-10-PCS | Mod: CPTII,,, | Performed by: SURGERY

## 2022-04-11 PROCEDURE — 99999 PR PBB SHADOW E&M-EST. PATIENT-LVL III: ICD-10-PCS | Mod: PBBFAC,,, | Performed by: SURGERY

## 2022-04-11 PROCEDURE — 3008F PR BODY MASS INDEX (BMI) DOCUMENTED: ICD-10-PCS | Mod: CPTII,,, | Performed by: SURGERY

## 2022-04-11 PROCEDURE — 4010F ACE/ARB THERAPY RXD/TAKEN: CPT | Mod: CPTII,,, | Performed by: SURGERY

## 2022-04-11 RX ORDER — SODIUM CHLORIDE 9 MG/ML
INJECTION, SOLUTION INTRAVENOUS CONTINUOUS
Status: CANCELLED | OUTPATIENT
Start: 2022-04-11

## 2022-04-11 RX ORDER — METRONIDAZOLE 500 MG/100ML
500 INJECTION, SOLUTION INTRAVENOUS
Status: CANCELLED | OUTPATIENT
Start: 2022-04-11

## 2022-04-11 NOTE — H&P (VIEW-ONLY)
Surgery Clinic Note    Vahe Molina is a 46 y.o. year old male in clinic today for follow up of contained and ruptured appendicitis, now following up outpatient. Had an isolated episode of vomiting. He is bloated but non tender and is having BMs.  No f/c/sob/cp    ROS:  Negative except above    Imaging:  3/24/22  Impression:     1. Grossly abnormal appearance with extensive inflammatory changes seen involving mid and distal small bowel loops in the right lower quadrant and mid to lower abdomen.  Questionable markedly dilated appendix seen measuring 3 cm versus abnormal appearance of distal small bowel loops.  No free air seen, however there is appearance of developing multilocular abscess in the mid to lower abdomen which could potentially relate to contained perforation.  Acute appendicitis versus reactive or other infectious/inflammatory enteritis to be considered.  Potential neoplastic process not excluded.  Surgical consultation is advised.    PE:  Vitals:    04/11/22 1107   BP: 133/85   Pulse: 84       NAD  No belabored breathing  No skin changes  Abd soft nt. Mildly distended.    A/P:  Vahe Molina is a 46 y.o. year old male w contained ruptured appendicitis treated with antibiotics    -to OR for interval appendectomy 4/27/22. Risks and side effects explained in detail. We especially focused on possible need for open surgery and possible bowel resection as disease process was atypical on the CT scan and we have to be prepared for any other indicated procedures.    Pancho Bernal  General Surgery - [facility]  4/11/2022

## 2022-04-20 ENCOUNTER — HOSPITAL ENCOUNTER (OUTPATIENT)
Dept: PREADMISSION TESTING | Facility: HOSPITAL | Age: 47
Discharge: HOME | End: 2022-04-20
Attending: SURGERY
Payer: MEDICAID

## 2022-04-20 ENCOUNTER — ANESTHESIA EVENT (OUTPATIENT)
Dept: SURGERY | Facility: HOSPITAL | Age: 47
End: 2022-04-20
Payer: MEDICAID

## 2022-04-20 ENCOUNTER — HOSPITAL ENCOUNTER (OUTPATIENT)
Dept: RADIOLOGY | Facility: HOSPITAL | Age: 47
Discharge: HOME | End: 2022-04-20
Attending: SURGERY
Payer: MEDICAID

## 2022-04-20 ENCOUNTER — TELEPHONE (OUTPATIENT)
Dept: SURGERY | Facility: CLINIC | Age: 47
End: 2022-04-20
Payer: MEDICAID

## 2022-04-20 VITALS
DIASTOLIC BLOOD PRESSURE: 79 MMHG | OXYGEN SATURATION: 98 % | RESPIRATION RATE: 16 BRPM | SYSTOLIC BLOOD PRESSURE: 122 MMHG | HEIGHT: 72 IN | HEART RATE: 86 BPM | TEMPERATURE: 97 F | WEIGHT: 218.69 LBS | BODY MASS INDEX: 29.62 KG/M2

## 2022-04-20 DIAGNOSIS — Z01.818 PREOPERATIVE TESTING: Primary | ICD-10-CM

## 2022-04-20 DIAGNOSIS — K35.33 APPENDICEAL ABSCESS: ICD-10-CM

## 2022-04-20 LAB
ANION GAP SERPL CALC-SCNC: 9 MMOL/L (ref 8–16)
BASOPHILS # BLD AUTO: 0.06 K/UL (ref 0–0.2)
BASOPHILS NFR BLD: 0.8 % (ref 0–1.9)
BUN SERPL-MCNC: 12 MG/DL (ref 6–20)
CALCIUM SERPL-MCNC: 9.3 MG/DL (ref 8.7–10.5)
CHLORIDE SERPL-SCNC: 104 MMOL/L (ref 95–110)
CO2 SERPL-SCNC: 24 MMOL/L (ref 23–29)
CREAT SERPL-MCNC: 1.2 MG/DL (ref 0.5–1.4)
DIFFERENTIAL METHOD: ABNORMAL
EOSINOPHIL # BLD AUTO: 0.4 K/UL (ref 0–0.5)
EOSINOPHIL NFR BLD: 5 % (ref 0–8)
ERYTHROCYTE [DISTWIDTH] IN BLOOD BY AUTOMATED COUNT: 12.2 % (ref 11.5–14.5)
EST. GFR  (AFRICAN AMERICAN): >60 ML/MIN/1.73 M^2
EST. GFR  (NON AFRICAN AMERICAN): >60 ML/MIN/1.73 M^2
GLUCOSE SERPL-MCNC: 115 MG/DL (ref 70–110)
HCT VFR BLD AUTO: 41 % (ref 40–54)
HGB BLD-MCNC: 13.2 G/DL (ref 14–18)
IMM GRANULOCYTES # BLD AUTO: 0.03 K/UL (ref 0–0.04)
IMM GRANULOCYTES NFR BLD AUTO: 0.4 % (ref 0–0.5)
LYMPHOCYTES # BLD AUTO: 3.7 K/UL (ref 1–4.8)
LYMPHOCYTES NFR BLD: 49.9 % (ref 18–48)
MCH RBC QN AUTO: 28.6 PG (ref 27–31)
MCHC RBC AUTO-ENTMCNC: 32.2 G/DL (ref 32–36)
MCV RBC AUTO: 89 FL (ref 82–98)
MONOCYTES # BLD AUTO: 0.6 K/UL (ref 0.3–1)
MONOCYTES NFR BLD: 7.8 % (ref 4–15)
NEUTROPHILS # BLD AUTO: 2.7 K/UL (ref 1.8–7.7)
NEUTROPHILS NFR BLD: 36.1 % (ref 38–73)
NRBC BLD-RTO: 0 /100 WBC
PLATELET # BLD AUTO: 283 K/UL (ref 150–450)
PMV BLD AUTO: 10.8 FL (ref 9.2–12.9)
POTASSIUM SERPL-SCNC: 4.4 MMOL/L (ref 3.5–5.1)
RBC # BLD AUTO: 4.61 M/UL (ref 4.6–6.2)
SODIUM SERPL-SCNC: 137 MMOL/L (ref 136–145)
WBC # BLD AUTO: 7.42 K/UL (ref 3.9–12.7)

## 2022-04-20 PROCEDURE — 71045 X-RAY EXAM CHEST 1 VIEW: CPT | Mod: TC,FY

## 2022-04-20 PROCEDURE — 80048 BASIC METABOLIC PNL TOTAL CA: CPT | Performed by: SURGERY

## 2022-04-20 PROCEDURE — 93005 ELECTROCARDIOGRAM TRACING: CPT

## 2022-04-20 PROCEDURE — 93010 ELECTROCARDIOGRAM REPORT: CPT | Mod: ,,, | Performed by: INTERNAL MEDICINE

## 2022-04-20 PROCEDURE — 71045 X-RAY EXAM CHEST 1 VIEW: CPT | Mod: 26,,, | Performed by: RADIOLOGY

## 2022-04-20 PROCEDURE — 36415 COLL VENOUS BLD VENIPUNCTURE: CPT | Performed by: SURGERY

## 2022-04-20 PROCEDURE — 93010 EKG 12-LEAD: ICD-10-PCS | Mod: ,,, | Performed by: INTERNAL MEDICINE

## 2022-04-20 PROCEDURE — 71045 XR CHEST 1 VIEW PRE-OP: ICD-10-PCS | Mod: 26,,, | Performed by: RADIOLOGY

## 2022-04-20 PROCEDURE — 85025 COMPLETE CBC W/AUTO DIFF WBC: CPT | Performed by: SURGERY

## 2022-04-20 PROCEDURE — 83036 HEMOGLOBIN GLYCOSYLATED A1C: CPT | Performed by: SURGERY

## 2022-04-20 NOTE — TELEPHONE ENCOUNTER
Attempted to contact patient to schedule appt with Dr. Lopez. No answer left brief voicemail        ----- Message from Francia Hernandez NP sent at 4/20/2022  3:26 PM CDT -----  Regarding: Lap Appendectomy 4/27  Hi Dr. Bernal,    Just wanted you to be aware of Mr. Molina's A1C is >14%.  We checked an A1C today in pre-op. The patient should follow up with PCP who manages his diabetes to see if she has any recommendations or needs further diabetes management. The patient reports he is eating better and has lost some weight since October.      Thanks,  Francia Hernandez, BONITA

## 2022-04-20 NOTE — DISCHARGE INSTRUCTIONS
Before 7 AM, enter through the Emergency Entrance..   After 7 AM enter through the Main Entrance.      Your procedure  is scheduled for __4/27/2022________.    Call [phone] between 2pm and 5pm on _4/26/2022______to find out your arrival time for the day of surgery.    You may use the main entrance to the hospital on the [facility] side, or the entrance that is next to the [facility].    You may have two visitors.  Visiting hours for non-COVID-19 patients expanded to 24/7 (still restricted to one visitor)  Youth visitation changed from age 18 to age 12.      You will be going to the Same Day Surgery Unit on the 2nd floor of the hospital.    Important instructions:  Do not eat anything after midnight.  You may have plain water, non carbonated.  You may also have Gatorade or Powerade after midnight.    Stop all fluids 2 hours before your surgery.    It is okay to brush your teeth.  Do not have gum, candy or mints.    SEE MEDICATION SHEET.   TAKE MEDICATIONS AS DIRECTED WITH SIPS OF WATER.      Do not take any diabetic medication on the morning of surgery unless instructed to do so by your doctor or pre op nurse.    STOP taking Aspirin, Ibuprofen,  Advil, Motrin, Mobic(meloxicam), Aleve (naproxen), Fish oil, and Vitamin E for at least 7 days before your surgery.     You may take Tylenol if needed which is not a blood thinner.    Please shower the night before and the morning of your surgery.      Use Hibiclens soap as instructed by your pre op nurse.   Please place clean linens on your bed the night before surgery. Please wear fresh clean clothing after each shower.    No shaving of procedural area at least 4-5 days before surgery due to increased risk of skin irritation and/or possible infection.    Contact lenses and removable denture work may not be worn during your procedure.    You may wear deodorant only. If you are having breast surgery, do not wear deodorant on the operative side.    Do not wear  powder, body lotion, perfume/cologne or make-up.    Do not wear any jewelry or have any metal on your body.    You will be asked to remove any dentures or partials for the procedure.    If you are going home on the same day of surgery, you must arrange for a family member or a friend to drive you home.  Public transportation is prohibited.  You will not be able to drive home if you were given anesthesia or sedation.    Patients who want to have their Post-op prescriptions filled from our in-house [facility], bring a Credit/Debit Card  or cash with you. A co-pay may be required.  The pharmacy closes at 5:30 pm.    Wear loose fitting clothes allowing for bandages.    Please leave money and valuables home.      You may bring your cell phone.    Call the doctor if fever or illness should occur before your surgery.    Call [phone] to contact us here if needed.

## 2022-04-20 NOTE — ANESTHESIA PREPROCEDURE EVALUATION
04/20/2022  Vahe Molina is a 46 y.o., male scheduled for APPENDECTOMY, LAPAROSCOPIC (N/A Abdomen) on 4/27/2022.    NPO >8  METS >4    Vitals:    04/27/22 1027   BP: 137/89   Pulse: 75   Resp: 18   Temp: 37.1 °C (98.7 °F)         Past Medical History:   Diagnosis Date    Diabetes mellitus     Hypertension        Past Surgical History:   Procedure Laterality Date    left knee sx       CT abd 4/20/22  Impression:     1. Grossly abnormal appearance with extensive inflammatory changes seen involving mid and distal small bowel loops in the right lower quadrant and mid to lower abdomen.  Questionable markedly dilated appendix seen measuring 3 cm versus abnormal appearance of distal small bowel loops.  No free air seen, however there is appearance of developing multilocular abscess in the mid to lower abdomen which could potentially relate to contained perforation.  Acute appendicitis versus reactive or other infectious/inflammatory enteritis to be considered.  Potential neoplastic process not excluded.  Surgical consultation is advised.  EKG  : 063 BPM     Atrial Rate : 063 BPM      P-R Int : 182 ms          QRS Dur : 114 ms       QT Int : 412 ms       P-R-T Axes : 053 017 017 degrees      QTc Int : 421 ms     Normal sinus rhythm   Normal ECG   When compared with ECG of 24-MAR-2022 17:49,   Significant changes have occurred   Confirmed by Mike Hernandez MD ([id number]) on 4/20/2022 5:14:43 PM     Pre-op Assessment    I have reviewed the Patient Summary Reports.     I have reviewed the Nursing Notes. I have reviewed the NPO Status.   I have reviewed the Medications.     Review of Systems  Anesthesia Hx:  No problems with previous Anesthesia  History of prior surgery of interest to airway management or planning: Denies Family Hx of Anesthesia complications.   Denies Personal Hx of Anesthesia complications.    Social:  Non-Smoker, No Alcohol Use    Hematology/Oncology:  Hematology Normal   Oncology Normal     EENT/Dental:EENT/Dental Normal   Cardiovascular:   Exercise tolerance: good Hypertension ECG has been reviewed.  Functional Capacity good / => 4 METS    Pulmonary:  Pulmonary Normal    Renal/:  Renal/ Normal     Hepatic/GI:   Appendiceal abscess   Musculoskeletal:  Musculoskeletal Normal    Neurological:  Neurology Normal    Endocrine:   Diabetes, type 2    Dermatological:  Skin Normal    Psych:  Psychiatric Normal           Physical Exam  General: Well nourished, Cooperative, Alert and Oriented        Anesthesia Plan  Type of Anesthesia, risks & benefits discussed:    Anesthesia Type: Gen ETT  Intra-op Monitoring Plan: Standard ASA Monitors  Post Op Pain Control Plan: multimodal analgesia and IV/PO Opioids PRN  Induction:  IV  Airway Plan: Video and Direct, Post-Induction  Informed Consent: Informed consent signed with the Patient and all parties understand the risks and agree with anesthesia plan.  All questions answered. Patient consented to blood products? Yes  ASA Score: 2  Day of Surgery Review of History & Physical: H&P Update referred to the surgeon/provider.    Ready For Surgery From Anesthesia Perspective.     .

## 2022-04-21 LAB
ESTIMATED AVG GLUCOSE: 123 MG/DL (ref 68–131)
HBA1C MFR BLD: 5.9 % (ref 4–5.6)

## 2022-04-26 ENCOUNTER — TELEPHONE (OUTPATIENT)
Dept: SURGERY | Facility: CLINIC | Age: 47
End: 2022-04-26
Payer: MEDICAID

## 2022-04-26 ENCOUNTER — PATIENT MESSAGE (OUTPATIENT)
Dept: SURGERY | Facility: HOSPITAL | Age: 47
End: 2022-04-26
Payer: MEDICAID

## 2022-04-26 NOTE — TELEPHONE ENCOUNTER
Spoke with  in regards to message, inform patient there is not a specific time surgery will be done. While in recovery he can have someone call his family member for transportation, pt verbalized understanding.

## 2022-04-27 ENCOUNTER — ANESTHESIA (OUTPATIENT)
Dept: SURGERY | Facility: HOSPITAL | Age: 47
End: 2022-04-27
Payer: MEDICAID

## 2022-04-27 ENCOUNTER — HOSPITAL ENCOUNTER (OUTPATIENT)
Facility: HOSPITAL | Age: 47
Discharge: HOME | End: 2022-04-28
Attending: SURGERY | Admitting: SURGERY
Payer: MEDICAID

## 2022-04-27 DIAGNOSIS — K35.32 RUPTURED APPENDICITIS: ICD-10-CM

## 2022-04-27 DIAGNOSIS — K35.33 APPENDICEAL ABSCESS: ICD-10-CM

## 2022-04-27 DIAGNOSIS — Z01.818 PREOPERATIVE TESTING: Primary | ICD-10-CM

## 2022-04-27 LAB
POCT GLUCOSE: 140 MG/DL (ref 70–110)
POCT GLUCOSE: 157 MG/DL (ref 70–110)

## 2022-04-27 PROCEDURE — 88304 TISSUE EXAM BY PATHOLOGIST: CPT | Performed by: PATHOLOGY

## 2022-04-27 PROCEDURE — 63600175 PHARM REV CODE 636 W HCPCS: Performed by: SURGERY

## 2022-04-27 PROCEDURE — 71000039 HC RECOVERY, EACH ADD'L HOUR: Performed by: SURGERY

## 2022-04-27 PROCEDURE — 63600175 PHARM REV CODE 636 W HCPCS: Performed by: ANESTHESIOLOGY

## 2022-04-27 PROCEDURE — D9220A PRA ANESTHESIA: Mod: ANES,,, | Performed by: ANESTHESIOLOGY

## 2022-04-27 PROCEDURE — D9220A PRA ANESTHESIA: ICD-10-PCS | Mod: ANES,,, | Performed by: ANESTHESIOLOGY

## 2022-04-27 PROCEDURE — 71000033 HC RECOVERY, INTIAL HOUR: Performed by: SURGERY

## 2022-04-27 PROCEDURE — 37000009 HC ANESTHESIA EA ADD 15 MINS: Performed by: SURGERY

## 2022-04-27 PROCEDURE — 36000709 HC OR TIME LEV III EA ADD 15 MIN: Performed by: SURGERY

## 2022-04-27 PROCEDURE — D9220A PRA ANESTHESIA: ICD-10-PCS | Mod: CRNA,,, | Performed by: STUDENT IN AN ORGANIZED HEALTH CARE EDUCATION/TRAINING PROGRAM

## 2022-04-27 PROCEDURE — 25000003 PHARM REV CODE 250: Performed by: ANESTHESIOLOGY

## 2022-04-27 PROCEDURE — 44970 PR LAP,APPENDECTOMY: ICD-10-PCS | Mod: ,,, | Performed by: SURGERY

## 2022-04-27 PROCEDURE — 25000003 PHARM REV CODE 250: Performed by: STUDENT IN AN ORGANIZED HEALTH CARE EDUCATION/TRAINING PROGRAM

## 2022-04-27 PROCEDURE — 63600175 PHARM REV CODE 636 W HCPCS: Performed by: STUDENT IN AN ORGANIZED HEALTH CARE EDUCATION/TRAINING PROGRAM

## 2022-04-27 PROCEDURE — D9220A PRA ANESTHESIA: Mod: CRNA,,, | Performed by: STUDENT IN AN ORGANIZED HEALTH CARE EDUCATION/TRAINING PROGRAM

## 2022-04-27 PROCEDURE — 36000708 HC OR TIME LEV III 1ST 15 MIN: Performed by: SURGERY

## 2022-04-27 PROCEDURE — 27201423 OPTIME MED/SURG SUP & DEVICES STERILE SUPPLY: Performed by: SURGERY

## 2022-04-27 PROCEDURE — 82962 GLUCOSE BLOOD TEST: CPT | Performed by: SURGERY

## 2022-04-27 PROCEDURE — 00840 ANES IPER PX LOWER ABD NOS: CPT | Performed by: SURGERY

## 2022-04-27 PROCEDURE — 25000003 PHARM REV CODE 250: Performed by: SURGERY

## 2022-04-27 PROCEDURE — S0030 INJECTION, METRONIDAZOLE: HCPCS | Performed by: SURGERY

## 2022-04-27 PROCEDURE — 88304 TISSUE EXAM BY PATHOLOGIST: CPT | Mod: 26,,, | Performed by: PATHOLOGY

## 2022-04-27 PROCEDURE — 44970 LAPAROSCOPY APPENDECTOMY: CPT | Mod: ,,, | Performed by: SURGERY

## 2022-04-27 PROCEDURE — 37000008 HC ANESTHESIA 1ST 15 MINUTES: Performed by: SURGERY

## 2022-04-27 PROCEDURE — 88304 PR  SURG PATH,LEVEL III: ICD-10-PCS | Mod: 26,,, | Performed by: PATHOLOGY

## 2022-04-27 RX ORDER — ACETAMINOPHEN 500 MG
1000 TABLET ORAL
Status: COMPLETED | OUTPATIENT
Start: 2022-04-27 | End: 2022-04-27

## 2022-04-27 RX ORDER — BUPIVACAINE HYDROCHLORIDE 2.5 MG/ML
INJECTION, SOLUTION INFILTRATION; PERINEURAL
Status: DISCONTINUED | OUTPATIENT
Start: 2022-04-27 | End: 2022-04-27 | Stop reason: HOSPADM

## 2022-04-27 RX ORDER — FENTANYL CITRATE 50 UG/ML
25 INJECTION, SOLUTION INTRAMUSCULAR; INTRAVENOUS EVERY 5 MIN PRN
Status: DISCONTINUED | OUTPATIENT
Start: 2022-04-27 | End: 2022-04-27 | Stop reason: HOSPADM

## 2022-04-27 RX ORDER — GLIMEPIRIDE 1 MG/1
1 TABLET ORAL EVERY MORNING
Status: DISCONTINUED | OUTPATIENT
Start: 2022-04-28 | End: 2022-04-28 | Stop reason: HOSPADM

## 2022-04-27 RX ORDER — SUCCINYLCHOLINE CHLORIDE 20 MG/ML
INJECTION INTRAMUSCULAR; INTRAVENOUS
Status: DISCONTINUED | OUTPATIENT
Start: 2022-04-27 | End: 2022-04-27

## 2022-04-27 RX ORDER — LIDOCAINE HYDROCHLORIDE 20 MG/ML
INJECTION INTRAVENOUS
Status: DISCONTINUED | OUTPATIENT
Start: 2022-04-27 | End: 2022-04-27

## 2022-04-27 RX ORDER — MIDAZOLAM HYDROCHLORIDE 1 MG/ML
INJECTION, SOLUTION INTRAMUSCULAR; INTRAVENOUS
Status: DISCONTINUED | OUTPATIENT
Start: 2022-04-27 | End: 2022-04-27

## 2022-04-27 RX ORDER — SODIUM CHLORIDE, SODIUM LACTATE, POTASSIUM CHLORIDE, CALCIUM CHLORIDE 600; 310; 30; 20 MG/100ML; MG/100ML; MG/100ML; MG/100ML
INJECTION, SOLUTION INTRAVENOUS CONTINUOUS
Status: DISCONTINUED | OUTPATIENT
Start: 2022-04-27 | End: 2022-04-27

## 2022-04-27 RX ORDER — ROCURONIUM BROMIDE 10 MG/ML
INJECTION, SOLUTION INTRAVENOUS
Status: DISCONTINUED | OUTPATIENT
Start: 2022-04-27 | End: 2022-04-27

## 2022-04-27 RX ORDER — ONDANSETRON 2 MG/ML
INJECTION INTRAMUSCULAR; INTRAVENOUS
Status: DISCONTINUED | OUTPATIENT
Start: 2022-04-27 | End: 2022-04-27

## 2022-04-27 RX ORDER — OXYCODONE AND ACETAMINOPHEN 5; 325 MG/1; MG/1
2 TABLET ORAL EVERY 4 HOURS PRN
Status: DISCONTINUED | OUTPATIENT
Start: 2022-04-27 | End: 2022-04-28 | Stop reason: HOSPADM

## 2022-04-27 RX ORDER — FENTANYL CITRATE 50 UG/ML
INJECTION, SOLUTION INTRAMUSCULAR; INTRAVENOUS
Status: DISCONTINUED | OUTPATIENT
Start: 2022-04-27 | End: 2022-04-27

## 2022-04-27 RX ORDER — KETAMINE HYDROCHLORIDE 100 MG/ML
INJECTION, SOLUTION INTRAMUSCULAR; INTRAVENOUS
Status: DISCONTINUED | OUTPATIENT
Start: 2022-04-27 | End: 2022-04-27

## 2022-04-27 RX ORDER — ONDANSETRON 2 MG/ML
4 INJECTION INTRAMUSCULAR; INTRAVENOUS EVERY 6 HOURS PRN
Status: DISCONTINUED | OUTPATIENT
Start: 2022-04-27 | End: 2022-04-28 | Stop reason: HOSPADM

## 2022-04-27 RX ORDER — LIDOCAINE HYDROCHLORIDE 10 MG/ML
1 INJECTION, SOLUTION EPIDURAL; INFILTRATION; INTRACAUDAL; PERINEURAL ONCE
Status: DISCONTINUED | OUTPATIENT
Start: 2022-04-27 | End: 2022-04-27

## 2022-04-27 RX ORDER — TALC
6 POWDER (GRAM) TOPICAL NIGHTLY PRN
Status: DISCONTINUED | OUTPATIENT
Start: 2022-04-27 | End: 2022-04-28 | Stop reason: HOSPADM

## 2022-04-27 RX ORDER — SODIUM CHLORIDE 9 MG/ML
INJECTION, SOLUTION INTRAVENOUS CONTINUOUS
Status: DISCONTINUED | OUTPATIENT
Start: 2022-04-27 | End: 2022-04-27

## 2022-04-27 RX ORDER — PROPOFOL 10 MG/ML
VIAL (ML) INTRAVENOUS
Status: DISCONTINUED | OUTPATIENT
Start: 2022-04-27 | End: 2022-04-27

## 2022-04-27 RX ORDER — SCOLOPAMINE TRANSDERMAL SYSTEM 1 MG/1
PATCH, EXTENDED RELEASE TRANSDERMAL
Status: DISCONTINUED | OUTPATIENT
Start: 2022-04-27 | End: 2022-04-27

## 2022-04-27 RX ORDER — KETOROLAC TROMETHAMINE 30 MG/ML
15 INJECTION, SOLUTION INTRAMUSCULAR; INTRAVENOUS EVERY 8 HOURS PRN
Status: DISCONTINUED | OUTPATIENT
Start: 2022-04-27 | End: 2022-04-27 | Stop reason: HOSPADM

## 2022-04-27 RX ORDER — METRONIDAZOLE 500 MG/100ML
500 INJECTION, SOLUTION INTRAVENOUS
Status: COMPLETED | OUTPATIENT
Start: 2022-04-27 | End: 2022-04-27

## 2022-04-27 RX ORDER — OXYCODONE AND ACETAMINOPHEN 5; 325 MG/1; MG/1
1 TABLET ORAL EVERY 4 HOURS PRN
Status: DISCONTINUED | OUTPATIENT
Start: 2022-04-27 | End: 2022-04-28 | Stop reason: HOSPADM

## 2022-04-27 RX ORDER — LOSARTAN POTASSIUM 25 MG/1
50 TABLET ORAL DAILY
Status: DISCONTINUED | OUTPATIENT
Start: 2022-04-28 | End: 2022-04-28 | Stop reason: HOSPADM

## 2022-04-27 RX ORDER — DEXAMETHASONE SODIUM PHOSPHATE 4 MG/ML
INJECTION, SOLUTION INTRA-ARTICULAR; INTRALESIONAL; INTRAMUSCULAR; INTRAVENOUS; SOFT TISSUE
Status: DISCONTINUED | OUTPATIENT
Start: 2022-04-27 | End: 2022-04-27

## 2022-04-27 RX ORDER — SODIUM CHLORIDE 0.9 % (FLUSH) 0.9 %
10 SYRINGE (ML) INJECTION
Status: DISCONTINUED | OUTPATIENT
Start: 2022-04-27 | End: 2022-04-27 | Stop reason: HOSPADM

## 2022-04-27 RX ADMIN — ESMOLOL HYDROCHLORIDE 10 MG: 10 INJECTION INTRAVENOUS at 02:04

## 2022-04-27 RX ADMIN — SCOPOLAMINE 1 PATCH: 1 PATCH, EXTENDED RELEASE TRANSDERMAL at 01:04

## 2022-04-27 RX ADMIN — FENTANYL CITRATE 25 MCG: 50 INJECTION INTRAMUSCULAR; INTRAVENOUS at 05:04

## 2022-04-27 RX ADMIN — KETAMINE HYDROCHLORIDE 10 MG: 100 INJECTION, SOLUTION, CONCENTRATE INTRAMUSCULAR; INTRAVENOUS at 02:04

## 2022-04-27 RX ADMIN — GLYCOPYRROLATE 0.2 MG: 0.2 INJECTION, SOLUTION INTRAMUSCULAR; INTRAVITREAL at 01:04

## 2022-04-27 RX ADMIN — LIDOCAINE HYDROCHLORIDE 100 MG: 20 INJECTION, SOLUTION INTRAVENOUS at 01:04

## 2022-04-27 RX ADMIN — SODIUM CHLORIDE, SODIUM LACTATE, POTASSIUM CHLORIDE, AND CALCIUM CHLORIDE: .6; .31; .03; .02 INJECTION, SOLUTION INTRAVENOUS at 04:04

## 2022-04-27 RX ADMIN — FENTANYL CITRATE 50 MCG: 50 INJECTION, SOLUTION INTRAMUSCULAR; INTRAVENOUS at 01:04

## 2022-04-27 RX ADMIN — ROCURONIUM BROMIDE 35 MG: 10 INJECTION, SOLUTION INTRAVENOUS at 02:04

## 2022-04-27 RX ADMIN — ONDANSETRON 4 MG: 2 INJECTION, SOLUTION INTRAMUSCULAR; INTRAVENOUS at 04:04

## 2022-04-27 RX ADMIN — ACETAMINOPHEN 1000 MG: 500 TABLET ORAL at 10:04

## 2022-04-27 RX ADMIN — DEXAMETHASONE SODIUM PHOSPHATE 8 MG: 4 INJECTION, SOLUTION INTRAMUSCULAR; INTRAVENOUS at 02:04

## 2022-04-27 RX ADMIN — OXYCODONE AND ACETAMINOPHEN 1 TABLET: 5; 325 TABLET ORAL at 08:04

## 2022-04-27 RX ADMIN — SODIUM CHLORIDE, SODIUM LACTATE, POTASSIUM CHLORIDE, AND CALCIUM CHLORIDE: .6; .31; .03; .02 INJECTION, SOLUTION INTRAVENOUS at 02:04

## 2022-04-27 RX ADMIN — SODIUM CHLORIDE, SODIUM LACTATE, POTASSIUM CHLORIDE, AND CALCIUM CHLORIDE: .6; .31; .03; .02 INJECTION, SOLUTION INTRAVENOUS at 10:04

## 2022-04-27 RX ADMIN — METRONIDAZOLE 500 MG: 500 INJECTION, SOLUTION INTRAVENOUS at 02:04

## 2022-04-27 RX ADMIN — ROCURONIUM BROMIDE 5 MG: 10 INJECTION, SOLUTION INTRAVENOUS at 01:04

## 2022-04-27 RX ADMIN — PROPOFOL 50 MG: 10 INJECTION, EMULSION INTRAVENOUS at 03:04

## 2022-04-27 RX ADMIN — FENTANYL CITRATE 25 MCG: 50 INJECTION, SOLUTION INTRAMUSCULAR; INTRAVENOUS at 03:04

## 2022-04-27 RX ADMIN — PROPOFOL 50 MG: 10 INJECTION, EMULSION INTRAVENOUS at 01:04

## 2022-04-27 RX ADMIN — SUGAMMADEX 200 MG: 100 INJECTION, SOLUTION INTRAVENOUS at 04:04

## 2022-04-27 RX ADMIN — MIDAZOLAM HYDROCHLORIDE 2 MG: 1 INJECTION, SOLUTION INTRAMUSCULAR; INTRAVENOUS at 01:04

## 2022-04-27 RX ADMIN — ROCURONIUM BROMIDE 10 MG: 10 INJECTION, SOLUTION INTRAVENOUS at 03:04

## 2022-04-27 RX ADMIN — SUCCINYLCHOLINE CHLORIDE 200 MG: 20 INJECTION, SOLUTION INTRAMUSCULAR; INTRAVENOUS at 01:04

## 2022-04-27 RX ADMIN — KETAMINE HYDROCHLORIDE 20 MG: 100 INJECTION, SOLUTION, CONCENTRATE INTRAMUSCULAR; INTRAVENOUS at 03:04

## 2022-04-27 RX ADMIN — KETOROLAC TROMETHAMINE 15 MG: 30 INJECTION, SOLUTION INTRAMUSCULAR at 05:04

## 2022-04-27 RX ADMIN — PROPOFOL 150 MG: 10 INJECTION, EMULSION INTRAVENOUS at 01:04

## 2022-04-27 RX ADMIN — CEFTRIAXONE 2 G: 2 INJECTION, SOLUTION INTRAVENOUS at 01:04

## 2022-04-27 RX ADMIN — FENTANYL CITRATE 25 MCG: 50 INJECTION, SOLUTION INTRAMUSCULAR; INTRAVENOUS at 04:04

## 2022-04-27 RX ADMIN — FENTANYL CITRATE 50 MCG: 50 INJECTION, SOLUTION INTRAMUSCULAR; INTRAVENOUS at 02:04

## 2022-04-27 RX ADMIN — KETAMINE HYDROCHLORIDE 20 MG: 100 INJECTION, SOLUTION, CONCENTRATE INTRAMUSCULAR; INTRAVENOUS at 01:04

## 2022-04-27 RX ADMIN — ESMOLOL HYDROCHLORIDE 10 MG: 10 INJECTION INTRAVENOUS at 03:04

## 2022-04-27 NOTE — PLAN OF CARE
Report called to jayesh gold on 4th floor. Pt transported per house transporter via bed to room 414. Pt awake alert and oriented, dressings clean dry and intact, vital signs stable, sister updated on pt room number. Pt denies any needs at this time.

## 2022-04-27 NOTE — TRANSFER OF CARE
Anesthesia Transfer of Care Note    Patient: Vahe Molina    Procedure(s) Performed: Procedure(s) (LRB):  LAPAROSCOPIC APPENDECTOMY, LYIS OF ADHESIONS. (N/A)    Patient location: PACU    Anesthesia Type: general    Transport from OR: Transported from OR on 6-10 L/min O2 by face mask with adequate spontaneous ventilation    Post pain: adequate analgesia    Post assessment: no apparent anesthetic complications and tolerated procedure well    Post vital signs: stable    Level of consciousness: sedated and responds to stimulation    Nausea/Vomiting: no nausea/vomiting    Complications: none    Transfer of care protocol was followed      Last vitals:   Visit Vitals  /73 (Patient Position: Lying)   Pulse 82   Temp 36.6 °C (97.9 °F)   Resp 14   Wt 99.2 kg (218 lb 11.1 oz)   SpO2 97%   BMI 29.66 kg/m²

## 2022-04-27 NOTE — OP NOTE
[facility] [place]  Operative Note    SUMMARY     Surgery Date: 4/27/2022     Surgeon(s) and Role:     * Pancho Bernal MD - Primary    Assisting Surgeon: None    Pre-op Diagnosis:  Appendiceal abscess [K35.33]    Post-op Diagnosis:  Post-Op Diagnosis Codes:     * Appendiceal abscess [K35.33]    Procedure(s) (LRB):  LAPAROSCOPIC APPENDECTOMY, LYIS OF ADHESIONS. (N/A)  Umbilical hernia repair    Anesthesia: General    Indication for procedure: Vahe Molina is 46 y.o. male with history of appendicitis w contained abscess presenting for interval appendectomy after resolution of symptoms. After discussion of disease process, we discussed options and have elected for operation to perform laparoscopic appendectomy, and other indicated procedures.    Description of Procedure: After consent was obtained, patient was taken to the OR. The abdomen was prepped and draped in a standard sterile fashion after general anesthesia was started. Time out was performed. Antibiotics were started with rocephin and flagyl. We began the procedure by incising the supraumbilical area and cut through the patient's prior umbilical hernia. We inserted the randall trocar and achieved pneumoperitoneum. We placed 5 mm trocar in suprapubic area and LLQ. Placed in trendelenberg and right side up. We noted the cecum to have some edema and dilation and the appendix was firm and stuck to surrounding tissue. itw as very socked in and adhered to sigmoid  And adjacent small bowel. We used the harmonic to take this down from surrounding tissues and the mesoappendix was very thick and firm and taken down. We took the appendix down to what appeared to be the base, there may have been some very dilated appendix that mimicked the cecum, but I took it down as far as safe and got the majority of the appendix. (We did consider there may have been a mass in the base of the appendix and it was large but I felt it was the cecum; we will plan on path follow  up and a follow up c-scope in the near future)  The base of the appendix was stapled with a laparoscopic stapler with a white load and it was hemostatic and fired with no issue. We placed surgicel in the base of the resection site and it was dry. No purulent fluid was noted. We did irrigate the pelvis and the site completely and thoroughly. No other issues were noted in the abdominal cavity.  The appendix was removed with an endocatch bag.  We closed the umbilical hernia with interrupted -0 ethibond sutures. It measured 1.5 cm x 1.5 cm and closed with no tension. We then closed the skin incisions with 4-0 monocryl subcuticular sutures covered with dermabond.  All counts were correct x2. Patient was awakened from anesthesia and taken to the recovery room in a stable condition having suffered no issues at this time.  Addison removed.    Description of the findings of the procedure:  Dilated appendix, staple resection.    Estimated Blood Loss: * No values recorded between 4/27/2022  2:13 PM and 4/27/2022  4:03 PM *         Specimens:   Specimen (24h ago, onward)             Start     Ordered    04/27/22 1548  Specimen to Pathology, Surgery General Surgery  Once        Comments: Pre-op Diagnosis: Appendiceal abscess [K35.33]Procedure(s):APPENDECTOMY, LAPAROSCOPIC Number of specimens: 1Name of specimens: Appendix     References:    Click here for ordering Quick Tip   Question Answer Comment   Procedure Type: General Surgery    Specimen Class: Routine/Screening    Which provider would you like to cc? MEREDITH WHEELER    Release to patient Immediate        04/27/22 1552              Drains/Implants: None

## 2022-04-27 NOTE — ANESTHESIA PROCEDURE NOTES
Intubation    Date/Time: 4/27/2022 1:54 PM  Performed by: Rosaura Kelsey CRNA  Authorized by: Lizet Lopez MD     Intubation:     Induction:  Intravenous    Intubated:  Postinduction    Mask Ventilation:  Easy with oral airway    Attempts:  1    Attempted By:  CRNA    Method of Intubation:  Video laryngoscopy    Blade:  Kwong 4    Laryngeal View Grade: Grade I - full view of cords      Difficult Airway Encountered?: No      Complications:  None    Airway Device:  Oral endotracheal tube    Airway Device Size:  7.5    Style/Cuff Inflation:  Cuffed (inflated to minimal occlusive pressure)    Tube secured:  22    Secured at:  The lips    Placement Verified By:  Capnometry    Complicating Factors:  None    Findings Post-Intubation:  BS equal bilateral and atraumatic/condition of teeth unchanged

## 2022-04-28 VITALS
SYSTOLIC BLOOD PRESSURE: 140 MMHG | DIASTOLIC BLOOD PRESSURE: 80 MMHG | HEART RATE: 87 BPM | OXYGEN SATURATION: 97 % | TEMPERATURE: 99 F | HEIGHT: 72 IN | WEIGHT: 231.5 LBS | RESPIRATION RATE: 18 BRPM | BODY MASS INDEX: 31.36 KG/M2

## 2022-04-28 LAB
ANION GAP SERPL CALC-SCNC: 11 MMOL/L (ref 8–16)
BASOPHILS # BLD AUTO: 0.02 K/UL (ref 0–0.2)
BASOPHILS NFR BLD: 0.1 % (ref 0–1.9)
BUN SERPL-MCNC: 16 MG/DL (ref 6–20)
CALCIUM SERPL-MCNC: 9.3 MG/DL (ref 8.7–10.5)
CHLORIDE SERPL-SCNC: 102 MMOL/L (ref 95–110)
CO2 SERPL-SCNC: 23 MMOL/L (ref 23–29)
CREAT SERPL-MCNC: 1.4 MG/DL (ref 0.5–1.4)
DIFFERENTIAL METHOD: ABNORMAL
EOSINOPHIL # BLD AUTO: 0 K/UL (ref 0–0.5)
EOSINOPHIL NFR BLD: 0 % (ref 0–8)
ERYTHROCYTE [DISTWIDTH] IN BLOOD BY AUTOMATED COUNT: 12.1 % (ref 11.5–14.5)
EST. GFR  (AFRICAN AMERICAN): >60 ML/MIN/1.73 M^2
EST. GFR  (NON AFRICAN AMERICAN): >60 ML/MIN/1.73 M^2
GLUCOSE SERPL-MCNC: 179 MG/DL (ref 70–110)
HCT VFR BLD AUTO: 38.4 % (ref 40–54)
HGB BLD-MCNC: 12.4 G/DL (ref 14–18)
IMM GRANULOCYTES # BLD AUTO: 0.04 K/UL (ref 0–0.04)
IMM GRANULOCYTES NFR BLD AUTO: 0.3 % (ref 0–0.5)
LYMPHOCYTES # BLD AUTO: 2 K/UL (ref 1–4.8)
LYMPHOCYTES NFR BLD: 13.7 % (ref 18–48)
MAGNESIUM SERPL-MCNC: 1.6 MG/DL (ref 1.6–2.6)
MCH RBC QN AUTO: 28.4 PG (ref 27–31)
MCHC RBC AUTO-ENTMCNC: 32.3 G/DL (ref 32–36)
MCV RBC AUTO: 88 FL (ref 82–98)
MONOCYTES # BLD AUTO: 1.2 K/UL (ref 0.3–1)
MONOCYTES NFR BLD: 7.8 % (ref 4–15)
NEUTROPHILS # BLD AUTO: 11.6 K/UL (ref 1.8–7.7)
NEUTROPHILS NFR BLD: 78.1 % (ref 38–73)
NRBC BLD-RTO: 0 /100 WBC
PHOSPHATE SERPL-MCNC: 2.6 MG/DL (ref 2.7–4.5)
PLATELET # BLD AUTO: 264 K/UL (ref 150–450)
PMV BLD AUTO: 10.6 FL (ref 9.2–12.9)
POTASSIUM SERPL-SCNC: 4.6 MMOL/L (ref 3.5–5.1)
RBC # BLD AUTO: 4.36 M/UL (ref 4.6–6.2)
SODIUM SERPL-SCNC: 136 MMOL/L (ref 136–145)
WBC # BLD AUTO: 14.85 K/UL (ref 3.9–12.7)

## 2022-04-28 PROCEDURE — 36415 COLL VENOUS BLD VENIPUNCTURE: CPT | Performed by: SURGERY

## 2022-04-28 PROCEDURE — 80048 BASIC METABOLIC PNL TOTAL CA: CPT | Performed by: SURGERY

## 2022-04-28 PROCEDURE — 25000003 PHARM REV CODE 250: Performed by: STUDENT IN AN ORGANIZED HEALTH CARE EDUCATION/TRAINING PROGRAM

## 2022-04-28 PROCEDURE — 83735 ASSAY OF MAGNESIUM: CPT | Performed by: SURGERY

## 2022-04-28 PROCEDURE — 84100 ASSAY OF PHOSPHORUS: CPT | Performed by: SURGERY

## 2022-04-28 PROCEDURE — 99226 PR SUBSEQUENT OBSERVATION CARE,LEVEL III: CPT | Mod: ,,, | Performed by: SURGERY

## 2022-04-28 PROCEDURE — 85025 COMPLETE CBC W/AUTO DIFF WBC: CPT | Performed by: SURGERY

## 2022-04-28 PROCEDURE — 99226 PR SUBSEQUENT OBSERVATION CARE,LEVEL III: ICD-10-PCS | Mod: ,,, | Performed by: SURGERY

## 2022-04-28 RX ORDER — ONDANSETRON 4 MG/1
4 TABLET, FILM COATED ORAL EVERY 6 HOURS PRN
Qty: 15 TABLET | Refills: 0 | Status: SHIPPED | OUTPATIENT
Start: 2022-04-28 | End: 2022-04-28 | Stop reason: SDUPTHER

## 2022-04-28 RX ORDER — OXYCODONE AND ACETAMINOPHEN 5; 325 MG/1; MG/1
1 TABLET ORAL EVERY 6 HOURS PRN
Qty: 28 TABLET | Refills: 0 | Status: SHIPPED | OUTPATIENT
Start: 2022-04-28 | End: 2022-04-28 | Stop reason: SDUPTHER

## 2022-04-28 RX ORDER — OXYCODONE AND ACETAMINOPHEN 5; 325 MG/1; MG/1
1 TABLET ORAL EVERY 6 HOURS PRN
Qty: 28 TABLET | Refills: 0 | Status: SHIPPED | OUTPATIENT
Start: 2022-04-28

## 2022-04-28 RX ORDER — ONDANSETRON 4 MG/1
4 TABLET, FILM COATED ORAL EVERY 6 HOURS PRN
Qty: 15 TABLET | Refills: 0 | Status: SHIPPED | OUTPATIENT
Start: 2022-04-28

## 2022-04-28 RX ADMIN — OXYCODONE AND ACETAMINOPHEN 1 TABLET: 5; 325 TABLET ORAL at 08:04

## 2022-04-28 RX ADMIN — GLIMEPIRIDE 1 MG: 1 TABLET ORAL at 08:04

## 2022-04-28 RX ADMIN — LOSARTAN POTASSIUM 50 MG: 25 TABLET, FILM COATED ORAL at 08:04

## 2022-04-28 NOTE — NURSING
Patient cleared by  and Gema GARRETT CM. AVS/discharge instructions reviewed with and given to patient. Patient verbalized understanding of instructions.  wrote paper prescriptions for patient and brought up by OR nurse. PIVs removed. No redness, no swelling noted. Pressure dressing placed. Patient declined transport. Patient escorted by this RN to elevator and directed to outpatient pharmacy to fill prescriptions. Gait steady. No distress noted.

## 2022-04-28 NOTE — PROGRESS NOTES
WRITTEN HEALTHCARE DISCHARGE INFORMATION      Things that YOU are RESPONSIBLE for to Manage Your Care At Home:     1. Getting your prescriptions filled.  2. Taking you medications as directed. DO NOT MISS ANY DOSES!  3. Going to your follow-up doctor appointments. This is important because it allows the doctor to monitor your progress and to determine if any changes need to be made to your treatment plan.     If you are unable to make your follow up appointments, please call the number listed and reschedule this appointment.      ____________HELP AT HOME____________________     Experiencing any SIGNS or SYMPTOMS: YOU CAN     Schedule a same day appopintment with your Primary Care Doctor or  you can call [facility] On [facility] Nurse Care Line for 24/7 assistance at [phone]     If you are experience any signs or symptoms that have become severe, Call 911 and come to your nearest Emergency Room.     Thank you for choosing [facility] and allowing us to care for you.   From your care management team:      [facility] within 48-72 hours to help manage your care after discharge. Please try to make sure that you answer your phone for this important phone call.     Follow-up Information     Pancho Bernal MD Follow up on 5/4/2022.    Specialties: General Surgery, Surgery  Why: at 9:45am  Contact information:  [address]  [place] [address]  [phone]

## 2022-04-28 NOTE — PLAN OF CARE
Plan of care review.    Problem: Adult Inpatient Plan of Care  Goal: Plan of Care Review  Outcome: Ongoing, Progressing  Goal: Patient-Specific Goal (Individualized)  Outcome: Ongoing, Progressing  Goal: Absence of Hospital-Acquired Illness or Injury  Outcome: Ongoing, Progressing  Goal: Optimal Comfort and Wellbeing  Outcome: Ongoing, Progressing  Goal: Readiness for Transition of Care  Outcome: Ongoing, Progressing     Problem: Infection  Goal: Absence of Infection Signs and Symptoms  Outcome: Ongoing, Progressing

## 2022-04-28 NOTE — DISCHARGE SUMMARY
[facility] Surg  Discharge Summary      Admit Date: 4/27/2022    Discharge Date and Time:  04/28/2022 10:40 AM    Admitting physician: Pancho Bernal MD     Discharging physician: same    Reason for Admission: Interval appendectomy    Procedures Performed: Procedure(s) (LRB):  LAPAROSCOPIC APPENDECTOMY, LYIS OF ADHESIONS. (N/A)    Hospital Course   Patient presented for his appendectomy which he tolerated well. He was kept overnight due to the length of the procedure and also the degree of difficulty ensuring there were no immediate issues. Patient is tolerating a regular diet, has appropriate pain control, ambulating well and will be discharged today       Physical exam:  General: NAD, laying comfortably in bed  HEENT: Moist mucosal membranes, no NG  CV: RRR  Pulm: Breathing comfortably on room air, no distress  Abd: Soft, non distended, appropriately ttp at the incision sites.   Ext: No edema, moves all, no pain  Neuro: AAOx3  Psych: Appropriate mood and affect    Goals of Care Treatment Preferences:  Code Status: Full Code      Consults: none    Significant Diagnostic Studies: n/a    Final Diagnoses:    Principal Problem: Ruptured appendicitis    Discharged Condition: good    Disposition: Home or Self Care    Follow Up/Patient Instructions:     Medications:  Reconciled Home Medications:      Medication List      START taking these medications    ondansetron 4 MG tablet  Commonly known as: ZOFRAN  Take 1 tablet (4 mg total) by mouth every 6 (six) hours as needed for Nausea.     oxyCODONE-acetaminophen 5-325 mg per tablet  Commonly known as: PERCOCET  Take 1 tablet by mouth every 6 (six) hours as needed for Pain.        CONTINUE taking these medications    blood sugar diagnostic Strp  Test blood sugar four times daily     dicyclomine 20 mg tablet  Commonly known as: BENTYL  Take 20 mg by mouth every 6 (six) hours as needed.     glimepiride 1 MG tablet  Commonly known as: AMARYL  Take 1 mg by mouth every  morning.     losartan 50 MG tablet  Commonly known as: COZAAR  Take 50 mg by mouth once daily.          Discharge Procedure Orders   Diet Adult Regular     Notify your health care provider if you experience any of the following:  temperature >100.4     Notify your health care provider if you experience any of the following:  persistent nausea and vomiting or diarrhea     Notify your health care provider if you experience any of the following:  severe uncontrolled pain     Notify your health care provider if you experience any of the following:  redness, tenderness, or signs of infection (pain, swelling, redness, odor or green/yellow discharge around incision site)     Notify your health care provider if you experience any of the following:  difficulty breathing or increased cough     Other restrictions (specify):   Order Comments: Avoid lifting anything heavier than 10lbs  Avoid strenuous movements and exercises  OK to shower tomorrow, do not submerge your incisions underwater      Follow-up Information     Pancho Bernal MD Follow up in 2 week(s).    Specialties: General Surgery, Surgery  Contact information:  [address]  [place] [id number] [phone]

## 2022-04-28 NOTE — NURSING
Patient arrived to unit via stretcher. Patient AAXO4. RR even and unlabored on room ai. Questions answered appropriately. PIVs intact, clean dry, and intact. Pain 2/10. 3 abd island/border in place, clean,dry, intact. IS teaching initiated, patient demonstrated technique. SCDs placed, teaching provided.  No distress noted. Will report to night RN.

## 2022-04-28 NOTE — PLAN OF CARE
04/28/22 1118   Final Note   Assessment Type Final Discharge Note   Anticipated Discharge Disposition Home   Hospital Resources/Appts/Education Provided Appointments scheduled and added to AVS   Post-Acute Status   Post-Acute Authorization Other   Other Status No Post-Acute Service Needs   Pts nurse Winkler notified that the pt can d/c from CM standpoint

## 2022-04-28 NOTE — PLAN OF CARE
Patient alert and oriented x4. Respirations even and unlabored. No distress noted. Skin warm and dry. Incision to abdomen. 3 lap sites noted. Dressing clean, dry, and intact. No complications noted. Iv x2. Saline locked. Patient complains of pain to abdomen. Pain medication given as needed. Scds to ble. Patient has no complaints at this time. Instructed to call for any needs. Bed in lowest position. Call bell within reach.     Problem: Adult Inpatient Plan of Care  Goal: Plan of Care Review  Outcome: Ongoing, Progressing  Flowsheets (Taken 4/28/2022 0516)  Plan of Care Reviewed With: patient  Goal: Patient-Specific Goal (Individualized)  Outcome: Ongoing, Progressing  Goal: Absence of Hospital-Acquired Illness or Injury  Outcome: Ongoing, Progressing  Intervention: Identify and Manage Fall Risk  Flowsheets (Taken 4/28/2022 0516)  Safety Promotion/Fall Prevention:   assistive device/personal item within reach   bed alarm refused   high risk medications identified   medications reviewed

## 2022-04-28 NOTE — PLAN OF CARE
Plan of care reviewed.      Problem: Adult Inpatient Plan of Care  Goal: Plan of Care Review  Outcome: Ongoing, Progressing  Goal: Patient-Specific Goal (Individualized)  Outcome: Ongoing, Progressing  Goal: Absence of Hospital-Acquired Illness or Injury  Outcome: Ongoing, Progressing  Goal: Optimal Comfort and Wellbeing  Outcome: Ongoing, Progressing  Goal: Readiness for Transition of Care  Outcome: Ongoing, Progressing     Problem: Infection  Goal: Absence of Infection Signs and Symptoms  Outcome: Ongoing, Progressing     Problem: Fall Injury Risk  Goal: Absence of Fall and Fall-Related Injury  Outcome: Ongoing, Progressing

## 2022-04-28 NOTE — PLAN OF CARE
04/28/22 1114   Discharge Planning   Assessment Type Discharge Planning Brief Assessment   Support Systems Spouse/significant other   Equipment Currently Used at Home none   Current Living Arrangements home/apartment/condo   Patient/Family Anticipates Transition to home   Patient/Family Anticipated Services at Transition none   DME Needed Upon Discharge  none   Discharge Plan A Home with family  (with followup)     [facility] [id number] [place]HAWN [address]HANG [address]  [address] [address]  Phone: [phone] Fax: [phone]

## 2022-04-29 ENCOUNTER — PATIENT MESSAGE (OUTPATIENT)
Dept: SURGERY | Facility: CLINIC | Age: 47
End: 2022-04-29
Payer: MEDICAID

## 2022-04-29 NOTE — ANESTHESIA POSTPROCEDURE EVALUATION
Anesthesia Post Evaluation    Patient: Vahe Molina    Procedure(s) Performed: Procedure(s) (LRB):  LAPAROSCOPIC APPENDECTOMY, LYIS OF ADHESIONS. (N/A)    Final Anesthesia Type: general      Patient location during evaluation: PACU  Patient participation: Yes- Able to Participate  Level of consciousness: awake and alert  Post-procedure vital signs: reviewed and stable  Pain management: adequate  Airway patency: patent    PONV status at discharge: No PONV  Anesthetic complications: no      Cardiovascular status: blood pressure returned to baseline and hemodynamically stable  Respiratory status: unassisted and spontaneous ventilation  Hydration status: euvolemic  Follow-up not needed.          Vitals Value Taken Time   /80 04/28/22 1123   Temp 37.2 °C (98.9 °F) 04/28/22 1123   Pulse 87 04/28/22 1123   Resp 18 04/28/22 1123   SpO2 97 % 04/28/22 1123         Event Time   Out of Recovery 18:16:00         Pain/Maddie Score: Pain Rating Prior to Med Admin: 4 (4/28/2022  8:43 AM)

## 2022-05-02 ENCOUNTER — PATIENT MESSAGE (OUTPATIENT)
Dept: SURGERY | Facility: CLINIC | Age: 47
End: 2022-05-02
Payer: MEDICAID

## 2022-05-02 LAB
FINAL PATHOLOGIC DIAGNOSIS: NORMAL
GROSS: NORMAL
Lab: NORMAL

## 2022-05-02 NOTE — TELEPHONE ENCOUNTER
Spoke with patient to further emphasize Dr. Bernal's previous message. I explained that we do not have anesthesia or oral surgery practioners in our office and that was why Dr. Bernal referred him to the ED. I explained that his follow up appointment on 5/4 would be to look at his surgery recovery, but that Dr. Bernal is not trained in oral issues. Pt stated he is unable to eat solid foods because his tongue presses to the sore part of his mouth. Pt verbalized understanding of the ED being the best option to look at this issue.

## 2022-05-04 ENCOUNTER — OFFICE VISIT (OUTPATIENT)
Dept: SURGERY | Facility: CLINIC | Age: 47
End: 2022-05-04
Payer: MEDICAID

## 2022-05-04 VITALS
HEART RATE: 84 BPM | WEIGHT: 211.75 LBS | SYSTOLIC BLOOD PRESSURE: 129 MMHG | DIASTOLIC BLOOD PRESSURE: 84 MMHG | HEIGHT: 72 IN | BODY MASS INDEX: 28.68 KG/M2

## 2022-05-04 DIAGNOSIS — K35.33 APPENDICEAL ABSCESS: Primary | ICD-10-CM

## 2022-05-04 PROCEDURE — 1159F MED LIST DOCD IN RCRD: CPT | Mod: CPTII,,, | Performed by: SURGERY

## 2022-05-04 PROCEDURE — 99024 POSTOP FOLLOW-UP VISIT: CPT | Mod: ,,, | Performed by: SURGERY

## 2022-05-04 PROCEDURE — 3008F BODY MASS INDEX DOCD: CPT | Mod: CPTII,,, | Performed by: SURGERY

## 2022-05-04 PROCEDURE — 3079F PR MOST RECENT DIASTOLIC BLOOD PRESSURE 80-89 MM HG: ICD-10-PCS | Mod: CPTII,,, | Performed by: SURGERY

## 2022-05-04 PROCEDURE — 1159F PR MEDICATION LIST DOCUMENTED IN MEDICAL RECORD: ICD-10-PCS | Mod: CPTII,,, | Performed by: SURGERY

## 2022-05-04 PROCEDURE — 3079F DIAST BP 80-89 MM HG: CPT | Mod: CPTII,,, | Performed by: SURGERY

## 2022-05-04 PROCEDURE — 99999 PR PBB SHADOW E&M-EST. PATIENT-LVL III: CPT | Mod: PBBFAC,,, | Performed by: SURGERY

## 2022-05-04 PROCEDURE — 4010F ACE/ARB THERAPY RXD/TAKEN: CPT | Mod: CPTII,,, | Performed by: SURGERY

## 2022-05-04 PROCEDURE — 99024 PR POST-OP FOLLOW-UP VISIT: ICD-10-PCS | Mod: ,,, | Performed by: SURGERY

## 2022-05-04 PROCEDURE — 3074F PR MOST RECENT SYSTOLIC BLOOD PRESSURE < 130 MM HG: ICD-10-PCS | Mod: CPTII,,, | Performed by: SURGERY

## 2022-05-04 PROCEDURE — 4010F PR ACE/ARB THEARPY RXD/TAKEN: ICD-10-PCS | Mod: CPTII,,, | Performed by: SURGERY

## 2022-05-04 PROCEDURE — 3044F PR MOST RECENT HEMOGLOBIN A1C LEVEL <7.0%: ICD-10-PCS | Mod: CPTII,,, | Performed by: SURGERY

## 2022-05-04 PROCEDURE — 99213 OFFICE O/P EST LOW 20 MIN: CPT | Mod: PBBFAC | Performed by: SURGERY

## 2022-05-04 PROCEDURE — 3044F HG A1C LEVEL LT 7.0%: CPT | Mod: CPTII,,, | Performed by: SURGERY

## 2022-05-04 PROCEDURE — 3074F SYST BP LT 130 MM HG: CPT | Mod: CPTII,,, | Performed by: SURGERY

## 2022-05-04 PROCEDURE — 3008F PR BODY MASS INDEX (BMI) DOCUMENTED: ICD-10-PCS | Mod: CPTII,,, | Performed by: SURGERY

## 2022-05-04 PROCEDURE — 99999 PR PBB SHADOW E&M-EST. PATIENT-LVL III: ICD-10-PCS | Mod: PBBFAC,,, | Performed by: SURGERY

## 2022-05-04 RX ORDER — LIDOCAINE HYDROCHLORIDE 20 MG/ML
SOLUTION OROPHARYNGEAL
Qty: 100 ML | Refills: 0 | Status: SHIPPED | OUTPATIENT
Start: 2022-05-04

## 2022-05-04 NOTE — PROGRESS NOTES
Surgery Clinic Note    Vahe Molina is a 46 y.o. year old male in clinic today for follow up of interval laparoscopic appendectomy. Doing well. Did have a cut on the mouth, upper roof, with pain. Improving.  Discussed benign pathology  No f/c/n/v/sob/cp    ROS:  Negative except above    Pathology:  Acute appendicitis    PE:  Vitals:    05/04/22 1009   BP: 129/84   Pulse: 84     NAD  No belabored breathing  Roof of mouth: 1 cm healing incision. No cellulitis, drainage, or bleeding.  No skin changes  Abd soft nt nd  Incisions c/d/I      A/P:  Vahe Molina is a 46 y.o. year old male s/p umbilical hernia repair and laparoscopic interval appendectomy    -gi referral/c scope (had a large edematous appendiceal stump/cecum, concern for residual pathology)  -viscous lidocaine 2%, script to pharmacy  -no heavy lifting 4 wks  -rtc prn    Pancho Bernal  General Surgery - [facility]  5/4/2022

## 2022-08-17 ENCOUNTER — TELEPHONE (OUTPATIENT)
Dept: GASTROENTEROLOGY | Facility: CLINIC | Age: 47
End: 2022-08-17

## 2023-03-06 NOTE — H&P
Responded to [employer].   Laura Chase BSN, RN, PHN  March 6, 2023       Surgery Clinic Note    Vahe Molina is a 46 y.o. year old male in clinic today for follow up of contained and ruptured appendicitis, now following up outpatient. Had an isolated episode of vomiting. He is bloated but non tender and is having BMs.  No f/c/sob/cp    ROS:  Negative except above    Imaging:  3/24/22  Impression:     1. Grossly abnormal appearance with extensive inflammatory changes seen involving mid and distal small bowel loops in the right lower quadrant and mid to lower abdomen.  Questionable markedly dilated appendix seen measuring 3 cm versus abnormal appearance of distal small bowel loops.  No free air seen, however there is appearance of developing multilocular abscess in the mid to lower abdomen which could potentially relate to contained perforation.  Acute appendicitis versus reactive or other infectious/inflammatory enteritis to be considered.  Potential neoplastic process not excluded.  Surgical consultation is advised.    PE:  Vitals:    04/11/22 1107   BP: 133/85   Pulse: 84       NAD  No belabored breathing  No skin changes  Abd soft nt. Mildly distended.    A/P:  Vahe Molina is a 46 y.o. year old male w contained ruptured appendicitis treated with antibiotics    -to OR for interval appendectomy 4/27/22. Risks and side effects explained in detail. We especially focused on possible need for open surgery and possible bowel resection as disease process was atypical on the CT scan and we have to be prepared for any other indicated procedures.    Pancho Bernal  General Surgery - [facility]  4/11/2022

## 2024-09-16 ENCOUNTER — OCCUPATIONAL HEALTH (OUTPATIENT)
Dept: URGENT CARE | Facility: CLINIC | Age: 49
End: 2024-09-16

## 2024-09-16 DIAGNOSIS — Z13.9 ENCOUNTER FOR SCREENING: Primary | ICD-10-CM

## 2025-03-18 ENCOUNTER — PATIENT MESSAGE (OUTPATIENT)
Dept: SURGERY | Facility: CLINIC | Age: 50
End: 2025-03-18
Payer: MEDICAID

## 2025-03-18 ENCOUNTER — TELEPHONE (OUTPATIENT)
Dept: SURGERY | Facility: CLINIC | Age: 50
End: 2025-03-18
Payer: MEDICAID

## 2025-03-18 NOTE — TELEPHONE ENCOUNTER
----- Message from Natalia Sevilla NP sent at 3/18/2025  8:57 AM CDT -----    ----- Message -----  From: Keila Carlos  Sent: 3/18/2025   8:57 AM CDT  To: Leona PHILLIPS Staff    Who Called: PtWhat is the request in detail: Requesting call back to discuss scheduling appt. Please adviseCan the clinic reply by [facility]? Katherine Call Back Number: [phone] Information:

## 2025-03-19 ENCOUNTER — OFFICE VISIT (OUTPATIENT)
Dept: SURGERY | Facility: CLINIC | Age: 50
End: 2025-03-19
Payer: MEDICAID

## 2025-03-19 VITALS
HEIGHT: 72 IN | WEIGHT: 174.19 LBS | DIASTOLIC BLOOD PRESSURE: 76 MMHG | BODY MASS INDEX: 23.59 KG/M2 | RESPIRATION RATE: 18 BRPM | HEART RATE: 88 BPM | SYSTOLIC BLOOD PRESSURE: 118 MMHG

## 2025-03-19 DIAGNOSIS — Z93.2 ILEOSTOMY IN PLACE: Primary | ICD-10-CM

## 2025-03-19 PROCEDURE — 99204 OFFICE O/P NEW MOD 45 MIN: CPT | Mod: S$PBB,,, | Performed by: NURSE PRACTITIONER

## 2025-03-19 PROCEDURE — 3044F HG A1C LEVEL LT 7.0%: CPT | Mod: CPTII,,, | Performed by: NURSE PRACTITIONER

## 2025-03-19 PROCEDURE — 3008F BODY MASS INDEX DOCD: CPT | Mod: CPTII,,, | Performed by: NURSE PRACTITIONER

## 2025-03-19 PROCEDURE — 3074F SYST BP LT 130 MM HG: CPT | Mod: CPTII,,, | Performed by: NURSE PRACTITIONER

## 2025-03-19 PROCEDURE — 3078F DIAST BP <80 MM HG: CPT | Mod: CPTII,,, | Performed by: NURSE PRACTITIONER

## 2025-03-19 PROCEDURE — 1159F MED LIST DOCD IN RCRD: CPT | Mod: CPTII,,, | Performed by: NURSE PRACTITIONER

## 2025-03-19 PROCEDURE — 99213 OFFICE O/P EST LOW 20 MIN: CPT | Mod: PBBFAC | Performed by: NURSE PRACTITIONER

## 2025-03-19 PROCEDURE — 99999 PR PBB SHADOW E&M-EST. PATIENT-LVL III: CPT | Mod: PBBFAC,,, | Performed by: NURSE PRACTITIONER

## 2025-03-19 PROCEDURE — 1160F RVW MEDS BY RX/DR IN RCRD: CPT | Mod: CPTII,,, | Performed by: NURSE PRACTITIONER

## 2025-03-19 NOTE — PROGRESS NOTES
Ostomy Office Visit History and Physical    Referring Md:   No referring provider defined for this encounter.    SUBJECTIVE:     Chief Complaint: ileostomy    History of Present Illness:  The patient is new patient to this practice.   Course is as follows:  Patient is a 49 y.o. male presents for ileostomy s/p colectomy on 2/19 with Dr. Hwang at [facility] with [facility].  Had HH. 1 visit, then never returned.   Using flat luba pouch with ring.  Changing q2-3 days.  Emptying 6-7x/day. 2x overnight. Not taking imodium  Eating well.  Walking 1.5 miles/day.      No longer with HH  Will have ostomy for approx 6 months      Review of patient's allergies indicates:  No Known Allergies    Past Medical History:   Diagnosis Date    Diabetes mellitus     Hypertension      Past Surgical History:   Procedure Laterality Date    LAPAROSCOPIC APPENDECTOMY N/A 4/27/2022    Procedure: LAPAROSCOPIC APPENDECTOMY, LYIS OF ADHESIONS.;  Surgeon: Pancho Bernal MD;  Location: [facility] OR;  Service: General;  Laterality: N/A;  RN PRE OP DONE 4/20/2022    left knee sx       Family History   Problem Relation Name Age of Onset    Diabetes Mother       Social History[1]     Review of Systems:  Review of Systems   Gastrointestinal:  Positive for diarrhea.       OBJECTIVE:     Vital Signs (Most Recent)  /76 (BP Location: Right arm, Patient Position: Sitting)   Pulse 88   Resp 18   Ht 6' (1.829 m)   Wt 79 kg (174 lb 2.6 oz)   BMI 23.62 kg/m²     Physical Exam:  General: Black or  male in no distress   Neuro: Alert and oriented to person, place, and time.  Moves all extremities.     HEENT: No icterus.  Trachea midline  Respiratory: Respirations are even and unlabored, no cough or audible wheezing  Skin: Warm dry and intact, No visible rashes, no jaundice  Abdomen:    3/19/25    Labs reviewed today:  Lab Results   Component Value Date    WBC 14.85 (H) 04/28/2022    HGB 9.6 (L) 02/22/2025    HCT 30.6 (L) 02/22/2025      04/28/2022    ALT <7 (L) 02/24/2025    AST 17 02/24/2025     02/27/2025    K 4.0 02/27/2025     04/28/2022    CREATININE 0.90 02/27/2025    BUN 9.9 02/27/2025    CO2 27 02/27/2025    HGBA1C 6.4 (H) 01/09/2025         ASSESSMENT/PLAN:     Diagnoses and all orders for this visit:    Ileostomy in place  -     OSTOMY SUPPLIES FOR HOME USE        Patient instructed to do the following routine for pouching:  Cleanse skin with water, dry well.  Apply skin barrier film. Allow to dry  Apply soft convex pouch. Placed in luba today.   28 mm today.   Pt counseled on DME suppliers for  ostomy pouches. Rx to [facility]  Pt also counseled on skin care, nutrition, hydration and ADL.  30 minute visit in face to face counseling.  Return clinic visit recommended CARLOS Horowitz  Colon and Rectal Surgery           [1]   Social History  Tobacco Use    Smoking status: Never    Smokeless tobacco: Never   Substance Use Topics    Alcohol use: Never    Drug use: Not Currently

## (undated) DEVICE — KIT ANTIFOG

## (undated) DEVICE — TRAY FOLEY 16FR INFECTION CONT

## (undated) DEVICE — RELOAD ECHELON FLEX WHT 60MM

## (undated) DEVICE — SUT ETHIBOND 0 CR/CT-2 8-18

## (undated) DEVICE — PACK ENDOSCOPY GENERAL

## (undated) DEVICE — TROCAR ENDOPATH XCEL 5X100MM

## (undated) DEVICE — ELECTRODE REM PLYHSV RETURN 9

## (undated) DEVICE — COVER OVERHEAD SURG LT BLUE

## (undated) DEVICE — Device

## (undated) DEVICE — RELOAD ECHELON FLEX BLU 60MM

## (undated) DEVICE — SYR 10CC LUER LOCK

## (undated) DEVICE — TROCAR KII BLLN 12MM 10CM

## (undated) DEVICE — UNDERGLOVES BIOGEL PI SIZE 7.5

## (undated) DEVICE — SUT ENDOLOOP PDSII 18 LIGA

## (undated) DEVICE — APPLICATOR SURGICEL ENDOSCOPIC

## (undated) DEVICE — NDL HYPO REG 25G X 1 1/2

## (undated) DEVICE — BAG TISS RETRV MONARCH 10MM

## (undated) DEVICE — SUT MONOCRYL 4-0 PS-2

## (undated) DEVICE — APPLICATOR CHLORAPREP ORN 26ML

## (undated) DEVICE — HEMOSTAT SURGICEL PWD 3G

## (undated) DEVICE — GLOVE SURGICAL LATEX SZ 7

## (undated) DEVICE — BLANKET UPPER BODY 78.7X29.9IN

## (undated) DEVICE — ADHESIVE DERMABOND MINI HV

## (undated) DEVICE — TUBING INSUFFLATION 10

## (undated) DEVICE — STAPLER ECHELON FLEX GST 60MM